# Patient Record
Sex: FEMALE | Race: WHITE
[De-identification: names, ages, dates, MRNs, and addresses within clinical notes are randomized per-mention and may not be internally consistent; named-entity substitution may affect disease eponyms.]

---
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
## 2018-10-28 ENCOUNTER — HOSPITAL ENCOUNTER (OUTPATIENT)
Dept: HOSPITAL 83 - LAB | Age: 18
Discharge: HOME | End: 2018-10-28
Attending: FAMILY MEDICINE
Payer: COMMERCIAL

## 2018-10-28 DIAGNOSIS — E78.00: Primary | ICD-10-CM

## 2018-10-28 DIAGNOSIS — E74.00: ICD-10-CM

## 2018-10-28 DIAGNOSIS — R53.83: ICD-10-CM

## 2018-10-28 DIAGNOSIS — F41.1: ICD-10-CM

## 2018-10-28 DIAGNOSIS — E55.9: ICD-10-CM

## 2018-10-28 LAB
ALBUMIN SERPL-MCNC: 3.7 GM/DL (ref 3.1–4.5)
ALP SERPL-CCNC: 89 U/L (ref 45–117)
ALT SERPL W P-5'-P-CCNC: 24 U/L (ref 12–78)
AST SERPL-CCNC: 14 IU/L (ref 3–35)
BUN SERPL-MCNC: 18 MG/DL (ref 7–24)
CHLORIDE SERPL-SCNC: 107 MMOL/L (ref 98–107)
CHOLEST SERPL-MCNC: 133 MG/DL (ref ?–200)
CREAT SERPL-MCNC: 1.04 MG/DL (ref 0.55–1.02)
ERYTHROCYTE [DISTWIDTH] IN BLOOD BY AUTOMATED COUNT: 14.1 % (ref 0–14.5)
HCT VFR BLD AUTO: 46.4 % (ref 37–47)
HDLC SERPL-MCNC: 33 MG/DL (ref 40–60)
HGB BLD-MCNC: 14.3 G/DL (ref 12–16)
LDLC SERPL DIRECT ASSAY-MCNC: 70 MG/DL (ref 9–159)
MCH RBC QN AUTO: 26 PG (ref 27–31)
MCHC RBC AUTO-ENTMCNC: 30.8 G/DL (ref 33–37)
MCV RBC AUTO: 84.4 FL (ref 81–99)
NRBC BLD QL AUTO: 0 10*3/UL (ref 0–0)
PLATELET # BLD AUTO: 307 10*3/UL (ref 130–400)
PMV BLD AUTO: 10.2 FL (ref 9.6–12.3)
POTASSIUM SERPL-SCNC: 4.1 MMOL/L (ref 3.5–5.1)
PROT SERPL-MCNC: 7.6 GM/DL (ref 6.4–8.2)
RBC # BLD AUTO: 5.5 10*6/UL (ref 4.1–5.1)
SODIUM SERPL-SCNC: 139 MMOL/L (ref 136–145)
TRIGL SERPL-MCNC: 149 MG/DL (ref ?–150)
TSH SERPL DL<=0.005 MIU/L-ACNC: 2.52 UIU/ML (ref 0.36–4.75)
VLDLC SERPL CALC-MCNC: 30 MG/DL (ref 6–40)
WBC NRBC COR # BLD AUTO: 14.2 10*3/UL (ref 4.8–10.8)

## 2019-10-01 ENCOUNTER — HOSPITAL ENCOUNTER (OUTPATIENT)
Dept: HOSPITAL 83 - LAB | Age: 19
Discharge: HOME | End: 2019-10-01
Attending: FAMILY MEDICINE
Payer: COMMERCIAL

## 2019-10-01 DIAGNOSIS — E74.00: ICD-10-CM

## 2019-10-01 DIAGNOSIS — L70.0: Primary | ICD-10-CM

## 2019-10-01 DIAGNOSIS — F41.1: ICD-10-CM

## 2019-10-01 DIAGNOSIS — D72.829: ICD-10-CM

## 2019-10-01 LAB
ALBUMIN SERPL-MCNC: 4 GM/DL (ref 3.1–4.5)
ALP SERPL-CCNC: 80 U/L (ref 45–117)
ALT SERPL W P-5'-P-CCNC: 40 U/L (ref 12–78)
AST SERPL-CCNC: 22 IU/L (ref 3–35)
BUN SERPL-MCNC: 12 MG/DL (ref 7–24)
CHLORIDE SERPL-SCNC: 105 MMOL/L (ref 98–107)
CHOLEST SERPL-MCNC: 155 MG/DL (ref ?–200)
CREAT SERPL-MCNC: 0.94 MG/DL (ref 0.55–1.02)
ERYTHROCYTE [DISTWIDTH] IN BLOOD BY AUTOMATED COUNT: 14 % (ref 0–14.5)
HCT VFR BLD AUTO: 44.7 % (ref 37–47)
HDLC SERPL-MCNC: 39 MG/DL (ref 40–60)
HGB BLD-MCNC: 14.5 G/DL (ref 12–16)
LDLC SERPL DIRECT ASSAY-MCNC: 89 MG/DL (ref 9–159)
MCH RBC QN AUTO: 27.2 PG (ref 27–31)
MCHC RBC AUTO-ENTMCNC: 32.4 G/DL (ref 33–37)
MCV RBC AUTO: 83.7 FL (ref 81–99)
NRBC BLD QL AUTO: 0 10*3/UL (ref 0–0)
PLATELET # BLD AUTO: 289 10*3/UL (ref 130–400)
PMV BLD AUTO: 10.5 FL (ref 9.6–12.3)
POTASSIUM SERPL-SCNC: 3.9 MMOL/L (ref 3.5–5.1)
PROT SERPL-MCNC: 7.8 GM/DL (ref 6.4–8.2)
RBC # BLD AUTO: 5.34 10*6/UL (ref 4.1–5.1)
SODIUM SERPL-SCNC: 138 MMOL/L (ref 136–145)
TRIGL SERPL-MCNC: 137 MG/DL (ref ?–150)
VLDLC SERPL CALC-MCNC: 27 MG/DL (ref 6–40)
WBC NRBC COR # BLD AUTO: 13.9 10*3/UL (ref 4.8–10.8)

## 2021-02-04 ENCOUNTER — ROUTINE PRENATAL (OUTPATIENT)
Dept: OBGYN CLINIC | Age: 21
End: 2021-02-04
Payer: COMMERCIAL

## 2021-02-04 ENCOUNTER — ANCILLARY PROCEDURE (OUTPATIENT)
Dept: OBGYN CLINIC | Age: 21
End: 2021-02-04
Payer: COMMERCIAL

## 2021-02-04 VITALS
HEIGHT: 66 IN | DIASTOLIC BLOOD PRESSURE: 73 MMHG | HEART RATE: 84 BPM | BODY MASS INDEX: 31.34 KG/M2 | TEMPERATURE: 97.9 F | SYSTOLIC BLOOD PRESSURE: 117 MMHG | WEIGHT: 195 LBS

## 2021-02-04 DIAGNOSIS — Z34.90 PREGNANCY, UNSPECIFIED GESTATIONAL AGE: ICD-10-CM

## 2021-02-04 LAB
GLUCOSE URINE, POC: NEGATIVE
PROTEIN UA: ABNORMAL

## 2021-02-04 PROCEDURE — 76811 OB US DETAILED SNGL FETUS: CPT | Performed by: OBSTETRICS & GYNECOLOGY

## 2021-02-04 PROCEDURE — 81002 URINALYSIS NONAUTO W/O SCOPE: CPT | Performed by: OBSTETRICS & GYNECOLOGY

## 2021-02-04 PROCEDURE — G8484 FLU IMMUNIZE NO ADMIN: HCPCS | Performed by: OBSTETRICS & GYNECOLOGY

## 2021-02-04 PROCEDURE — 1036F TOBACCO NON-USER: CPT | Performed by: OBSTETRICS & GYNECOLOGY

## 2021-02-04 PROCEDURE — 99202 OFFICE O/P NEW SF 15 MIN: CPT | Performed by: OBSTETRICS & GYNECOLOGY

## 2021-02-04 PROCEDURE — G8427 DOCREV CUR MEDS BY ELIG CLIN: HCPCS | Performed by: OBSTETRICS & GYNECOLOGY

## 2021-02-04 PROCEDURE — 99203 OFFICE O/P NEW LOW 30 MIN: CPT | Performed by: OBSTETRICS & GYNECOLOGY

## 2021-02-04 PROCEDURE — G8419 CALC BMI OUT NRM PARAM NOF/U: HCPCS | Performed by: OBSTETRICS & GYNECOLOGY

## 2021-02-04 NOTE — PATIENT INSTRUCTIONS
Please arrive for your scheduled appointment at least 15 minutes early with your actual insurance card+ a photo ID. Also if you need any refills ordered or have questions, it may take up 48 hours to reply. Please allow ample time for your refills. Call me when you use last refill. Thank you for your cooperation. Any questions contact Danielelfego at [phone]. If you are experiencing an emergency and need immediate help, call 911 or go to go emergency room or labor and delivery. Call your primary obstetrician with bleeding, leaking of fluid, abdominal tenderness, headache, blurry vision, epigastric pain and increased urinary frequency. if you are sick, not feeling well or have an infectious process going on please reschedule your appointment by calling [phone]. Also if any family members are not feeling well, please do not bring them to your appointment. We appreciate your cooperation. We are doing this in order to protect our pregnant mothers+ their babies. Patient Education        Weeks 18 to 22 of Your Pregnancy: Care Instructions  Your Care Instructions     Your baby is continuing to develop quickly. At this stage, babies can now suck their thumbs,  firmly with their hands, and open and close their eyelids. Sometime between 18 and 22 weeks, you will start to feel your baby move. At first, these small fetal movements feel like fluttering or \"butterflies. \" Some women say that they feel like gas bubbles. As the baby grows, these movements will become stronger. You may also notice that your baby kicks and hiccups. During this time, you may find that your nausea and fatigue are gone. Overall, you may feel better and have more energy than you did in your first trimester. But you may also have new discomforts now, such as sleep problems or leg cramps. This care sheet can help you ease these discomforts. Follow-up care is a key part of your treatment and safety.  Be sure to make and go to all appointments, and call your doctor if you are having problems. It's also a good idea to know your test results and keep a list of the medicines you take. How can you care for yourself at home? Ease sleep problems  · Avoid caffeine in drinks or chocolate late in the day. · Get some exercise every day. · Take a warm shower or bath before bed. · Have a light snack or glass of milk at bedtime. · Do relaxation exercises in bed to calm your mind and body. · Support your legs and back with extra pillows. Try a pillow between your legs if you sleep on your side. · Do not use sleeping pills or alcohol. They could harm your baby. Ease leg cramps  · Do not massage your calf during the cramp. · Sit on a firm bed or chair. Straighten your leg, and bend your foot (flex your ankle) slowly upward, toward your knee. Bend your toes up and down. · Stand on a cool, flat surface. Stretch your toes upward, and take small steps walking on your heels. · Use a heating pad or hot water bottle to help with muscle ache. Prevent leg cramps  · Be sure to get enough calcium. If you are worried that you are not getting enough, talk to your doctor. · Exercise every day, and stretch your legs before bed. · Take a warm bath before bed, and try leg warmers at night. Where can you learn more? Go to https://EdCaliberlisbeteb.[employer]. org and sign in to your Minubo account. Enter O785 in the [facility] box to learn more about \"Weeks 18 to 22 of Your Pregnancy: Care Instructions. \"     If you do not have an account, please click on the \"Sign Up Now\" link. Current as of: February 11, 2020               Content Version: 12.6  © 2817-6978 [employer]. Care instructions adapted under license by [facility] ([facility]). If you have questions about a medical condition or this instruction, always ask your healthcare professional. [address] any warranty or liability for your use of this information.          Patient Education        Learning About When to Call Your Doctor During Pregnancy (After 20 Weeks)  Your Care Instructions  It's common to have concerns about what might be a problem during pregnancy. Although most pregnant women don't have any serious problems, it's important to know when to call your doctor if you have certain symptoms or signs of labor. These are general suggestions. Your doctor may give you some more information about when to call. When to call your doctor (after 20 weeks)  Call 911 anytime you think you may need emergency care. For example, call if:  · You have severe vaginal bleeding. · You have sudden, severe pain in your belly. · You passed out (lost consciousness). · You have a seizure. · You see or feel the umbilical cord. · You think you are about to deliver your baby and can't make it safely to the hospital.  Call your doctor now or seek immediate medical care if:  · You have vaginal bleeding. · You have belly pain. · You have a fever. · You have symptoms of preeclampsia, such as:  ? Sudden swelling of your face, hands, or feet. ? New vision problems (such as dimness, blurring, or seeing spots). ? A severe headache. · You have a sudden release of fluid from your vagina. (You think your water broke.)  · You think that you may be in labor. This means that you've had at least 6 contractions in an hour. · You notice that your baby has stopped moving or is moving much less than normal.  · You have symptoms of a urinary tract infection. These may include:  ? Pain or burning when you urinate. ? A frequent need to urinate without being able to pass much urine. ? Pain in the flank, which is just below the rib cage and above the waist on either side of the back. ? Blood in your urine. Watch closely for changes in your health, and be sure to contact your doctor if:  · You have vaginal discharge that smells bad. · You have skin changes, such as:  ? A rash. ? Itching.   ? Yellow color to your skin. · You have other concerns about your pregnancy. If you have labor signs at 37 weeks or more  If you have signs of labor at 37 weeks or more, your doctor may tell you to call when your labor becomes more active. Symptoms of active labor include:  · Contractions that are regular. · Contractions that are less than 5 minutes apart. · Contractions that are hard to talk through. Follow-up care is a key part of your treatment and safety. Be sure to make and go to all appointments, and call your doctor if you are having problems. It's also a good idea to know your test results and keep a list of the medicines you take. Where can you learn more? Go to https://Hello UniversepeMonthlys.[employer]. org and sign in to your Tap.Me account. Enter  in the [employer] box to learn more about \"Learning About When to Call Your Doctor During Pregnancy (After 20 Weeks). \"     If you do not have an account, please click on the \"Sign Up Now\" link. Current as of: February 11, 2020               Content Version: 12.6  © 6938-2284 [employer]. Care instructions adapted under license by [facility] ([facility]). If you have questions about a medical condition or this instruction, always ask your healthcare professional. [place] [address] any warranty or liability for your use of this information.

## 2021-02-20 NOTE — PROGRESS NOTES
MFM Office Visit 2/4/2021      [facility] FETAL MEDICINE  [address] [place] [address].  [address] [place] [address]. [id number]  Ph: [phone] Fax: [phone]  February 4 ,2021    RE: Maryjane Chapa  [birth year]  Dear Anila Rico   :       Thank you for sending  [facility]. [facility]    for consultation and ultrasound in our office on  2/4/2021    REASON FOR CONSULTATION:   ? Abn Screen result o28.9  ? Fetal Anatomy Survey z36  ? Obesity o99.21  ? Size>/<Dates  o26.84  ( Unsure LMP / Irregular periods )   ? Her chart was reviewed, her medical, surgical , and OBG history was examined along with any supporting documents available to me . ? 1/12/21- US - 17w1d ? EDC 6.21.21  ( I dont have access to these images/measurements)  ? Quad screen 1/18 - using 6.21.21 EDC . --> @ 18w   - Reported risk  trisomy 21- 1:254   - No data provided to lab re weight, smoking status, race, DM risk   ? Her recent clinical visits and notes were reviewed. ? Her recent laboratory and pathology  testing was reviewed    Review of Systems :    CONSTITUTIONAL: No fever, no chills , no undue aches, pain    HEENT: No headache, no visual changes, no sore throat . No loss of smell, taste   PULM: No dyspnea, no cough   CARDIO:  No chest pains, no palpitations   GI: No N/V, no D/C, no diarrhea, no abdominal pain     : No dysuria, no vaginal bleeding or fluid leaking or discharge    She reports good fetal movement   PHYSICAL EXAMINATION: VSS - Afebrile  BMI 31    General Appearance: Healthy looking, alert , no acute distress.  Eyes: No pallor, no icterus, no photophobia.  Back: No CVA tenderness.  Abdomen: Soft, non-tender.  Extremities: No pretibial pitting edema    An ultrasound evaluation was done today. Please refer to the enclosed copy of the ultrasound report for further detailed information. ULTRASOUND IMPRESSION:    ? US is not diagnostic for fetal aneuploidy and may not detect all structural defects even if multiple exams are performed. karyotype abnormalities. I advised her that screening testing does not rule out the possibility of a fetal karyotype abnormality.  The noninvasive  tests (NIPT) available  screen for a limited number of most frequent chromosome abnormalities. - The more comprehensive testing currently available uses cell free fetal DNA    I advised her that the test screens only for trisomy 21, 18 and 13 ( +/-  T-16,-22)  and sex chromosomes.  Some tests also estimate a risk for fetal microdeletions- DiGeorge,Prader Willi/Angelman, Sonjane Apgar, Jordyn-Giedon, Cri-du Chat, Smith-Hirschorn, 1p36 deletion.   - She understands that the NIPT screening testing does not replace diagnostic genetic testing.  - Amnio - I discussed with the patient the option of performing a diagnostic genetic amniocentesis. We discussed the risks of the procedure, which include, but are not limited to possibility of injury to the fetus and the risk of pregnancy loss, which occurs in approximately 1 in every 200 hundred procedures performed. The patient stated that she would not consider the option of pregnancy termination under any circumstances, and therefore did not want to take the procedural related risk of pregnancy loss. .She declines amniocentesis and decided not to have NIPT drawn today  RECOMMENDATIONS:  1. Mid Trimester  concerns and precautions reviewed with patient. Discussed fetal movements and the role of  testing to help optimize growth and development and help predict/ avoid stress. Discussed trouble signs to watch for. 2. The patient is to continue to follow with you in your office for ongoing obstetric care. 3. Followup visit in  4 weeks . 4. [facility] Appointment has been tentatively scheduled 3.18.21   5. Followup timing recommendations during current COVID surge may change depending on community status and relative benefit/ risk  6. COVID precautions reviewed- advised to check with hospital before arrival  7.  Further evaluation and management will be dependent on her clinical presentation and the results of her testing. Once again, thank you for allowing us to participate in the care of this patient and if we can be of any further assistance to you, please do not hesitate to contact us. Sincerely,      Debra Chand MD  I was present during her visit , supervised the ultrasound examination and provided the patient evaluation and management. The total time in minutes spent regarding the patient today, reviewing medical records, reviewing imaging studies, performing ultrasonic imaging, reviewing laboratory testing, and documenting information was 26 minutes, of which, 50% of the time was spent in patient education, counseling, and coordinating care with the patient, her provider, and/or her family. I answered all of her questions to her satisfaction.  I asked her to call if she had any additional questions prior to her next visit

## 2021-03-18 ENCOUNTER — ROUTINE PRENATAL (OUTPATIENT)
Dept: OBGYN CLINIC | Age: 21
End: 2021-03-18
Payer: COMMERCIAL

## 2021-03-18 ENCOUNTER — ANCILLARY PROCEDURE (OUTPATIENT)
Dept: OBGYN CLINIC | Age: 21
End: 2021-03-18
Payer: COMMERCIAL

## 2021-03-18 VITALS
HEIGHT: 66 IN | HEART RATE: 97 BPM | DIASTOLIC BLOOD PRESSURE: 67 MMHG | SYSTOLIC BLOOD PRESSURE: 109 MMHG | TEMPERATURE: 97.5 F | WEIGHT: 205.4 LBS | BODY MASS INDEX: 33.01 KG/M2

## 2021-03-18 DIAGNOSIS — Z3A.26 26 WEEKS GESTATION OF PREGNANCY: ICD-10-CM

## 2021-03-18 DIAGNOSIS — O28.0 ABNORMAL QUAD SCREEN: ICD-10-CM

## 2021-03-18 DIAGNOSIS — Z34.90 PREGNANCY, UNSPECIFIED GESTATIONAL AGE: Primary | ICD-10-CM

## 2021-03-18 LAB
GLUCOSE URINE, POC: NEGATIVE
PROTEIN UA: NEGATIVE

## 2021-03-18 PROCEDURE — G8427 DOCREV CUR MEDS BY ELIG CLIN: HCPCS | Performed by: OBSTETRICS & GYNECOLOGY

## 2021-03-18 PROCEDURE — 99213 OFFICE O/P EST LOW 20 MIN: CPT | Performed by: OBSTETRICS & GYNECOLOGY

## 2021-03-18 PROCEDURE — 81002 URINALYSIS NONAUTO W/O SCOPE: CPT | Performed by: OBSTETRICS & GYNECOLOGY

## 2021-03-18 PROCEDURE — G8419 CALC BMI OUT NRM PARAM NOF/U: HCPCS | Performed by: OBSTETRICS & GYNECOLOGY

## 2021-03-18 PROCEDURE — 1036F TOBACCO NON-USER: CPT | Performed by: OBSTETRICS & GYNECOLOGY

## 2021-03-18 PROCEDURE — 76816 OB US FOLLOW-UP PER FETUS: CPT | Performed by: OBSTETRICS & GYNECOLOGY

## 2021-03-18 PROCEDURE — 99212 OFFICE O/P EST SF 10 MIN: CPT | Performed by: OBSTETRICS & GYNECOLOGY

## 2021-03-18 PROCEDURE — G8484 FLU IMMUNIZE NO ADMIN: HCPCS | Performed by: OBSTETRICS & GYNECOLOGY

## 2021-03-18 NOTE — PATIENT INSTRUCTIONS
Call your primary obstetrician with bleeding, leaking of fluid, abdominal tenderness, headache, blurry vision, epigastric pain and increased urinary frequency. Any questions contact Arjun at [phone]. If you are experiencing an emergency and need immediate help, call 911 or go to go emergency room or labor and delivery. Please arrive for your scheduled appointment at least 15 minutes early with your actual insurance card+ a photo ID. Also if you need any refills ordered or have questions, it may take up 48 hours to reply. Please allow ample time for your refills. Call me when you use last refill. Thank you for your cooperation. You might be having an NST at your next appt. Please eat a large snack or breakfast before coming to office. Thank youDo kick counts after dinner. Call your primary obstetrician if less than 10 kicks in 2 hours after dinner. Call your primary obstetrician with bleeding, leaking of fluid, abdominal tenderness, headache, blurry vision, epigastric pain and increased urinary frequency. if you are sick, not feeling well or have an infectious process going on please reschedule your appointment by calling [phone]. Also if any family members are not feeling well, please do not bring them to your appointment. We appreciate your cooperation. We are doing this in order to protect our pregnant mothers+ their babies. Patient Education        Learning About When to Call Your Doctor During Pregnancy (After 20 Weeks)  Your Care Instructions  It's common to have concerns about what might be a problem during pregnancy. Although most pregnant women don't have any serious problems, it's important to know when to call your doctor if you have certain symptoms or signs of labor. These are general suggestions. Your doctor may give you some more information about when to call. When to call your doctor (after 20 weeks)  Call 911 anytime you think you may need emergency care.  For example, call if:  · You have severe vaginal bleeding. · You have sudden, severe pain in your belly. · You passed out (lost consciousness). · You have a seizure. · You see or feel the umbilical cord. · You think you are about to deliver your baby and can't make it safely to the hospital.  Call your doctor now or seek immediate medical care if:  · You have vaginal bleeding. · You have belly pain. · You have a fever. · You have symptoms of preeclampsia, such as:  ? Sudden swelling of your face, hands, or feet. ? New vision problems (such as dimness, blurring, or seeing spots). ? A severe headache. · You have a sudden release of fluid from your vagina. (You think your water broke.)  · You think that you may be in labor. This means that you've had at least 6 contractions in an hour. · You notice that your baby has stopped moving or is moving much less than normal.  · You have symptoms of a urinary tract infection. These may include:  ? Pain or burning when you urinate. ? A frequent need to urinate without being able to pass much urine. ? Pain in the flank, which is just below the rib cage and above the waist on either side of the back. ? Blood in your urine. Watch closely for changes in your health, and be sure to contact your doctor if:  · You have vaginal discharge that smells bad. · You have skin changes, such as:  ? A rash. ? Itching. ? Yellow color to your skin. · You have other concerns about your pregnancy. If you have labor signs at 37 weeks or more  If you have signs of labor at 37 weeks or more, your doctor may tell you to call when your labor becomes more active. Symptoms of active labor include:  · Contractions that are regular. · Contractions that are less than 5 minutes apart. · Contractions that are hard to talk through. Follow-up care is a key part of your treatment and safety. Be sure to make and go to all appointments, and call your doctor if you are having problems.  It's also a good idea to know your test results and keep a list of the medicines you take. Where can you learn more? Go to https://chpepiceweb.En[employer]a. org and sign in to your [employer] account. Enter  in the [facility] box to learn more about \"Learning About When to Call Your Doctor During Pregnancy (After 20 Weeks). \"     If you do not have an account, please click on the \"Sign Up Now\" link. Current as of: October 8, 2020               Content Version: 12.8  © 3221-0188 [employer]. Care instructions adapted under license by [facility] ([facility]). If you have questions about a medical condition or this instruction, always ask your healthcare professional. [place] [address] any warranty or liability for your use of this information. Patient Education        Counting Your Baby's Kicks: Care Instructions  Your Care Instructions     Counting your baby's kicks is one way your doctor can tell that your baby is healthy. Most women--especially in a first pregnancy--feel their baby move for the first time between 16 and 22 weeks. The movement may feel like flutters rather than kicks. Your baby may move more at certain times of the day. When you are active, you may notice less kicking than when you are resting. At your prenatal visits, your doctor will ask whether the baby is active. In your last trimester, your doctor may ask you to count the number of times you feel your baby move. Follow-up care is a key part of your treatment and safety. Be sure to make and go to all appointments, and call your doctor if you are having problems. It's also a good idea to know your test results and keep a list of the medicines you take. How do you count fetal kicks? · A common method of checking your baby's movement is to count the number of kicks or moves you feel in 1 hour.  Ten movements (such as kicks, flutters, or rolls) in 1 hour are normal. Some doctors suggest that you count in the morning until you get to 10 movements. Then you can quit for that day and start again the next day. · Pick your baby's most active time of day to count. This may be any time from morning to evening. · If you do not feel 10 movements in an hour, your baby may be sleeping. Wait for the next hour and count again. When should you call for help? Call your doctor now or seek immediate medical care if:    · You noticed that your baby has stopped moving or is moving much less than normal.   Watch closely for changes in your health, and be sure to contact your doctor if you have any problems. Where can you learn more? Go to https://Mattscloset.compeInfaCare Pharmaceuticaleb.Visitar. org and sign in to your TheTakes account. Enter O956 in the [employer] box to learn more about \"Counting Your Baby's Kicks: Care Instructions. \"     If you do not have an account, please click on the \"Sign Up Now\" link. Current as of: October 8, 2020               Content Version: 12.8  © 2006-2021 [employer], Incorporated. Care instructions adapted under license by [facility] ([facility]). If you have questions about a medical condition or this instruction, always ask your healthcare professional. [place] [address] any warranty or liability for your use of this information.

## 2021-03-24 NOTE — PROGRESS NOTES
[facility] Office Visit 4.52.63      [facility] 31.  [address] [facility], [place]. [address]  Ph: [phone] Fax: [phone]       RE: Genoveva Amato  89  Dear Juan Field   :       Thank you for sending  Ms. Fili Marx    for consultation and ultrasound in our office on  3/18/21      REASON FOR CONSULTATION: 18yo WF  @ 26w3d   · Abn Screen result o28.9  · Fetal Anatomy Survey z36  · Obesity o99.21  · Size>/<Dates  o26.84  ( Unsure LMP / Irregular periods )   · Her chart was reviewed, her medical, surgical , and OBG history was examined along with any supporting documents available to me . ? 21- US - 17w1d ? EDC 21  ( I dont have access to these images/measurements)  ? Quad screen  - using 21 EDC . --> @ 18w   § Reported risk  trisomy 24- 1:254   § No data provided to lab re weight, smoking status, race, DM risk   · Her recent clinical visits and notes were reviewed. · Her recent laboratory and pathology  testing was reviewed     Review of Systems :   · CONSTITUTIONAL: No fever, no chills , no undue aches, pain   · HEENT: No headache, no visual changes, no sore throat . No loss of smell, taste  · PULM: No dyspnea, no cough  · CARDIO:  No chest pains, no palpitations  · GI: No N/V, no D/C, no diarrhea, no abdominal pain   ·  : No dysuria, no vaginal bleeding or fluid leaking or discharge   · She reports good fetal movement   PHYSICAL EXAMINATION: VSS - Afebrile  BMI 31   · General Appearance: Healthy looking, alert , no acute distress. · Eyes: No pallor, no icterus, no photophobia. · Back: No CVA tenderness. · Abdomen: Soft, non-tender. · Extremities: No pretibial pitting edema     An ultrasound evaluation was done today. Please refer to the enclosed copy of the ultrasound report for further detailed information. ULTRASOUND IMPRESSION:    · Within developmental and technical limits of ultrasound assessment,   ?  Breech Female   fetus @  34w2d ? Active, responsive baby. The amniotic fluid volume appears normal.   ? The fetus is measuring appropriate for gestational age. ? 1030g (2:4)  62%ile  ? There has been good interval growth and development . ? Fetal anatomy was reviewed and appears normal.   § ~Many of the important findings cannot be fully assessed at this gestational age  [de-identified] heart appeared structurally intact, with an intact septum, valve and outflow tract configuration. §   Smaller clinically significant lesions, such as atrial septa defects adjacent to the foramen ovale, may not become detectable until baby arrives. ? Transabdominal views of cervix appears to be closed, long, without significant funneling upon Valsalva. ? Soft markers for aneuploidy are examined and found to be favorable. We discussed soft markers - and utility for assessing increased or lessened fetal genetic risks   Of note , her baby did NOT show any of the following soft markers- (reassuring)   · NO Pyelectasis   · NO Structural anomaly   · NO Cardiac defect  Or echogenic foci   · NO Increased Nuchal fold>6mm  · NO Echogenic bowel  · NO Sandal gap  · NO Choroid plexus cyst  · NO Absent umbilical artery  · NO Hypoplastic 5th digit  · NO Short or low set  ear  · NO Palmar crease  · NO Missing /Short Nasal Bone  · NO Aberrant Right subclavian Artery  ·  NO Iliac angle > 90     We discussed the concept that a fetus passing all these genetic ultrasound screen tests would lessen the risk estimate by  ~60%-  ( i.e. 1:254? 1:684)   With the important Caveat that 1/3 of all babies born with Down syndrome showed no birth defects or previous ultrasound markers . · She noted fetal movement, no cramping or contractions . Discussion:   §  We discussed the options for genetic screening for fetal karyotype abnormalities. I advised her that screening testing does not rule out the possibility of a fetal karyotype abnormality.     § The noninvasive  tests (NIPT) available  screen for a limited number of most frequent chromosome abnormalities. § The more comprehensive testing currently available uses cell free fetal DNA   § I advised her that the test screens only for trisomy 21, 18 and 13 ( +/-  T-16,-22)  and sex chromosomes. § Some tests also estimate a risk for fetal microdeletions- DiGeorge,Prader Willi/Angelman, Jigneshvin Landau, Jordyn-Giedon, Cri-du Chat, Smith-Hirschorn, 1p36 deletion. § She understands that the NIPT screening testing does not replace diagnostic genetic testing. § Amnio - I discussed with the patient the option of performing a diagnostic genetic amniocentesis. We discussed the risks of the procedure, which include, but are not limited to possibility of injury to the fetus and the risk of pregnancy loss, which occurs in approximately 1 in every 200 hundred procedures performed. The patient stated that she would not consider the option of pregnancy termination under any circumstances, and therefore did not want to take the procedural related risk of pregnancy loss. .She declines amniocentesis and decided not to have NIPT drawn again today    RECOMMENDATIONS:  1. Second Trimester  concerns and precautions reviewed with patient. Discussed fetal movements and the role of  testing to help optimize growth and development and help predict/ avoid stress. Discussed trouble signs to watch for. 2. The patient is to continue to follow with you in your office for ongoing obstetric care. 3. Followup visit in  4 weeks . 4. MFM Appointment has been tentatively scheduled 21   5. Followup timing recommendations during current COVID surge may change depending on community status and relative benefit/ risk  6. COVID precautions reviewed- advised to check with hospital before arrival  7. Further evaluation and management will be dependent on her clinical presentation and the results of her testing.     Once again, thank you for allowing us to

## 2021-05-06 ENCOUNTER — ANCILLARY PROCEDURE (OUTPATIENT)
Dept: OBGYN CLINIC | Age: 21
End: 2021-05-06
Payer: COMMERCIAL

## 2021-05-06 ENCOUNTER — ROUTINE PRENATAL (OUTPATIENT)
Dept: OBGYN CLINIC | Age: 21
End: 2021-05-06
Payer: COMMERCIAL

## 2021-05-06 VITALS
BODY MASS INDEX: 33.8 KG/M2 | WEIGHT: 210.3 LBS | DIASTOLIC BLOOD PRESSURE: 73 MMHG | HEART RATE: 89 BPM | SYSTOLIC BLOOD PRESSURE: 119 MMHG | HEIGHT: 66 IN

## 2021-05-06 DIAGNOSIS — O28.9 ABNORMAL FINDINGS ON ANTENATAL SCREENING OF MOTHER: ICD-10-CM

## 2021-05-06 DIAGNOSIS — Z3A.33 33 WEEKS GESTATION OF PREGNANCY: Primary | ICD-10-CM

## 2021-05-06 DIAGNOSIS — O26.843 UTERINE SIZE-DATE DISCREPANCY IN THIRD TRIMESTER: ICD-10-CM

## 2021-05-06 LAB
GLUCOSE URINE, POC: NEGATIVE
PROTEIN UA: NEGATIVE

## 2021-05-06 PROCEDURE — G8427 DOCREV CUR MEDS BY ELIG CLIN: HCPCS | Performed by: OBSTETRICS & GYNECOLOGY

## 2021-05-06 PROCEDURE — 76820 UMBILICAL ARTERY ECHO: CPT | Performed by: OBSTETRICS & GYNECOLOGY

## 2021-05-06 PROCEDURE — 81002 URINALYSIS NONAUTO W/O SCOPE: CPT | Performed by: OBSTETRICS & GYNECOLOGY

## 2021-05-06 PROCEDURE — 76819 FETAL BIOPHYS PROFIL W/O NST: CPT | Performed by: OBSTETRICS & GYNECOLOGY

## 2021-05-06 PROCEDURE — 99212 OFFICE O/P EST SF 10 MIN: CPT | Performed by: OBSTETRICS & GYNECOLOGY

## 2021-05-06 PROCEDURE — 76816 OB US FOLLOW-UP PER FETUS: CPT | Performed by: OBSTETRICS & GYNECOLOGY

## 2021-05-06 PROCEDURE — G8419 CALC BMI OUT NRM PARAM NOF/U: HCPCS | Performed by: OBSTETRICS & GYNECOLOGY

## 2021-05-06 PROCEDURE — 99213 OFFICE O/P EST LOW 20 MIN: CPT | Performed by: OBSTETRICS & GYNECOLOGY

## 2021-05-06 PROCEDURE — 1036F TOBACCO NON-USER: CPT | Performed by: OBSTETRICS & GYNECOLOGY

## 2021-05-06 NOTE — PATIENT INSTRUCTIONS
time. You can't decide at the last minute. If you haven't already had the Tdap shot during this pregnancy, talk to your doctor about getting it. It will help protect your  against pertussis infection. Follow-up care is a key part of your treatment and safety. Be sure to make and go to all appointments, and call your doctor if you are having problems. It's also a good idea to know your test results and keep a list of the medicines you take. How can you care for yourself at home? Ease hemorrhoids  · Get more liquids, fruits, vegetables, and fiber in your diet. This will help keep your stools soft. · Avoid sitting for too long. Lie on your left side several times a day. · Clean yourself with soft, moist toilet paper. Or you can use witch hazel pads or personal hygiene pads. · If you are uncomfortable, try ice packs. Or you can sit in a warm sitz bath. Do these for 20 minutes at a time, as needed. · Use hydrocortisone cream for pain and itching. Two examples are Anusol and Preparation H Hydrocortisone. · Ask your doctor about taking an over-the-counter stool softener. Consider breastfeeding  · Experts recommend that women breastfeed for 1 year or longer. · Breast milk may help protect your child from some health problems.  babies are less likely than formula-fed babies to:  ? Get ear infections, colds, diarrhea, and pneumonia. ? Be obese or get diabetes later in life. · Women who breastfeed have less bleeding after the birth. Their uteruses also shrink back faster. · Some women who breastfeed lose weight faster. Making milk burns calories. · Breastfeeding can lower your risk of breast cancer, ovarian cancer, and osteoporosis. Decide about circumcision for boys  · As you make this decision, it may help to think about your personal, Zoroastrianism, and family traditions. You get to decide if you will keep your son's penis natural or if he will be circumcised.   · If you decide that you would like to have your baby circumcised, talk with your doctor. You can share your concerns about pain. And you can discuss your preferences for anesthesia. Where can you learn more? Go to https://chpetony.healthCalsys. org and sign in to your [employer] account. Enter A587 in the [facility] box to learn more about \"Weeks 32 to 34 of Your Pregnancy: Care Instructions. \"     If you do not have an account, please click on the \"Sign Up Now\" link. Current as of: October 8, 2020               Content Version: 12.8  © 5849-8657 [employer]. Care instructions adapted under license by [facility] ([facility]). If you have questions about a medical condition or this instruction, always ask your healthcare professional. [place] [address] any warranty or liability for your use of this information. Patient Education        Learning About When to Call Your Doctor During Pregnancy (After 20 Weeks)  Your Care Instructions  It's common to have concerns about what might be a problem during pregnancy. Although most pregnant women don't have any serious problems, it's important to know when to call your doctor if you have certain symptoms or signs of labor. These are general suggestions. Your doctor may give you some more information about when to call. When to call your doctor (after 20 weeks)  Call 911 anytime you think you may need emergency care. For example, call if:  · You have severe vaginal bleeding. · You have sudden, severe pain in your belly. · You passed out (lost consciousness). · You have a seizure. · You see or feel the umbilical cord. · You think you are about to deliver your baby and can't make it safely to the hospital.  Call your doctor now or seek immediate medical care if:  · You have vaginal bleeding. · You have belly pain. · You have a fever. · You have symptoms of preeclampsia, such as:  ? Sudden swelling of your face, hands, or feet.   ? New vision problems (such as dimness, blurring, or seeing spots). ? A severe headache. · You have a sudden release of fluid from your vagina. (You think your water broke.)  · You think that you may be in labor. This means that you've had at least 6 contractions in an hour. · You notice that your baby has stopped moving or is moving much less than normal.  · You have symptoms of a urinary tract infection. These may include:  ? Pain or burning when you urinate. ? A frequent need to urinate without being able to pass much urine. ? Pain in the flank, which is just below the rib cage and above the waist on either side of the back. ? Blood in your urine. Watch closely for changes in your health, and be sure to contact your doctor if:  · You have vaginal discharge that smells bad. · You have skin changes, such as:  ? A rash. ? Itching. ? Yellow color to your skin. · You have other concerns about your pregnancy. If you have labor signs at 37 weeks or more  If you have signs of labor at 37 weeks or more, your doctor may tell you to call when your labor becomes more active. Symptoms of active labor include:  · Contractions that are regular. · Contractions that are less than 5 minutes apart. · Contractions that are hard to talk through. Follow-up care is a key part of your treatment and safety. Be sure to make and go to all appointments, and call your doctor if you are having problems. It's also a good idea to know your test results and keep a list of the medicines you take. Where can you learn more? Go to https://Telecoast Communicationsradha.Absio. org and sign in to your [employer] account. Enter  in the [facility] box to learn more about \"Learning About When to Call Your Doctor During Pregnancy (After 20 Weeks). \"     If you do not have an account, please click on the \"Sign Up Now\" link. Current as of: October 8, 2020               Content Version: 12.8  © 3419-8190 [employer].    Care instructions adapted under license by [facility] ([facility]). If you have questions about a medical condition or this instruction, always ask your healthcare professional. [address] any warranty or liability for your use of this information. Patient Education        Counting Your Baby's Kicks: Care Instructions  Your Care Instructions     Counting your baby's kicks is one way your doctor can tell that your baby is healthy. Most women--especially in a first pregnancy--feel their baby move for the first time between 16 and 22 weeks. The movement may feel like flutters rather than kicks. Your baby may move more at certain times of the day. When you are active, you may notice less kicking than when you are resting. At your prenatal visits, your doctor will ask whether the baby is active. In your last trimester, your doctor may ask you to count the number of times you feel your baby move. Follow-up care is a key part of your treatment and safety. Be sure to make and go to all appointments, and call your doctor if you are having problems. It's also a good idea to know your test results and keep a list of the medicines you take. How do you count fetal kicks? · A common method of checking your baby's movement is to count the number of kicks or moves you feel in 1 hour. Ten movements (such as kicks, flutters, or rolls) in 1 hour are normal. Some doctors suggest that you count in the morning until you get to 10 movements. Then you can quit for that day and start again the next day. · Pick your baby's most active time of day to count. This may be any time from morning to evening. · If you do not feel 10 movements in an hour, your baby may be sleeping. Wait for the next hour and count again. When should you call for help?    Call your doctor now or seek immediate medical care if:    · You noticed that your baby has stopped moving or is moving much less than normal.   Watch closely for changes in your health, and be sure to

## 2021-05-24 NOTE — PROGRESS NOTES
[facility] Office Visit 21        [facility] 31.  [address] [place], [place]. [address]  Ph: [phone] Fax: [phone]  May 6,  2021     RE: Sathya Mistry  89  Dear Veena Power   :       Thank you for sending  Ms. Alber Huddleston    for consultation and ultrasound in our office on  21      REASON FOR CONSULTATION: 16yo WF  @ 33w3d   · Abn Screen result o28.9  · Fetal Anatomy Survey z36  · Obesity o99.21  · Size>/<Dates  o26.84  ( Unsure LMP / Irregular periods )   · Her chart was reviewed, her medical, surgical , and OBG history was examined along with any supporting documents available to me . ? 21- US - 17w1d ? EDC 21  ( I dont have access to these images/measurements)  ? Quad screen  - using 21 EDC . --> @ 18w   § Reported risk  trisomy 24- 1:254   § No data provided to lab re weight, smoking status, race, DM risk   · Her recent clinical visits and notes were reviewed. · Her recent laboratory and pathology  testing was reviewed  · 3/29/21  Ihr GS WNL, reassuring CBC      Review of Systems :   · CONSTITUTIONAL: No fever, no chills , no undue aches, pain   · HEENT: No headache, no visual changes, no sore throat . No loss of smell, taste  · PULM: No dyspnea, no cough  · CARDIO:  No chest pains, no palpitations  · GI: No N/V, no D/C, no diarrhea, no abdominal pain   ·  : No dysuria, no vaginal bleeding or fluid leaking or discharge   · She reports good fetal movement   PHYSICAL EXAMINATION: VSS - Afebrile  BMI 34  · General Appearance: Healthy looking, alert , no acute distress. · Eyes: No pallor, no icterus, no photophobia. · Back: No CVA tenderness. · Abdomen: Soft, non-tender. · Extremities: No pretibial pitting edema     An ultrasound evaluation was done today. Please refer to the enclosed copy of the ultrasound report for further detailed information.   ULTRASOUND IMPRESSION:    · Within developmental and technical limits of ultrasound assessment,   ? [place] Female   fetus @  33w3d   ? Active, responsive baby. The amniotic fluid volume appears normal.   ? The fetus is measuring appropriate for gestational age. ? 2327g (5:2)  67%ile  ? There has been very good interval growth and development . ? Fetal anatomy was reviewed and appears normal.   §   Babys heart appeared structurally intact, with an intact septum, valve and outflow tract configuration. §   Smaller clinically significant lesions, such as atrial septa defects adjacent to the foramen ovale, may not become detectable until baby arrives. ? Transabdominal views of cervix appears to be closed, long, without significant funneling upon Valsalva. ? Soft markers for aneuploidy are examined and found to be favorable. · She noted fetal movement, no cramping or contractions . Discussion:   §  We discussed the options for genetic screening for fetal karyotype abnormalities. § She declines amniocentesis and decided not to have NIPT drawn again today     RECOMMENDATIONS:  1. Third Trimester  concerns and precautions reviewed with patient. Discussed fetal movements and the role of  testing to help optimize growth and development and help predict/ avoid stress. Discussed trouble signs to watch for. 2. The patient is to continue to follow with you in your office for ongoing obstetric care. 3. Followup visit in  4 weeks . 4. MFM Appointment has been tentatively scheduled 6.3.21   5. Followup timing recommendations during current COVID surge may change depending on community status and relative benefit/ risk  6. Recent 2021  COVID vaccination /ACOG/SMFM/CDC recommendations reviewed- family will consider this. 7. Further evaluation and management will be dependent on her clinical presentation and the results of her testing.     Once again, thank you for allowing us to participate in the care of this patient and if we can be of any further assistance to you, please do not hesitate to contact us. Sincerely,      Gloria Camarena MD  I was present during her visit , supervised the ultrasound examination and provided the patient evaluation and management. The total time in minutes spent regarding the patient today, reviewing medical records, reviewing imaging studies, performing ultrasonic imaging, reviewing laboratory testing, and documenting information was 16 minutes, of which, 50% of the time was spent in patient education, counseling, and coordinating care with the patient, her provider, and/or her family. I answered all of her questions to her satisfaction. I asked her to call if she had any additional questions prior to her next visit        ACOG  COVID 19/ Pregnancy Recommendation April 2021  1. COVID-19 vaccines should be available and administered to pregnant individuals who choose to be vaccinated. 2. ACOG recommends COVID-19 vaccines be offered to lactating individuals. 3. There is currently no preference for the use of one COVID-19 vaccine over another except for 1217 year olds who are only eligible for the Pfizer-BioNtech vaccine  4. However, pregnant, lactating, and post-partum people aged <50 years should be aware of the rare risk of TTS after receipt of the Cindi Fess COVID-19 vaccine and that other FDA-authorized COVID-19 vaccines (i.e., mRNA vaccines) are available. 5. Vaccination can be received in any clinical setting and nonclinical community-based vaccination sites such as schools, community centers, and other mass vaccination locations. 6. Pregnant individuals who experience fever following vaccination should be counseled to take acetaminophen. Acetaminophen has been proven to be safe for use in pregnancy and does not appear to impact antibody response to COVID-19 vaccines. 7. COVID-19 vaccines should not be administered within 14 days of receipt of another vaccine.  For pregnant individuals, vaccines including Tdap and influenza should be deferred for 14 days after the administration of COVID-19 vaccines. 8. Anti-D immunoglobulin (i.e. Rhogam) should not be withheld from an individual who is planning or has recently received a COVID-19 vaccine as it will not interfere with the immune response to the vaccine. 9. In the interest of patient autonomy, ACOG recommends that pregnant individuals be free to make their own decision regarding COVID-19 vaccination. 10. Pregnant individuals who receive a COVID-19 vaccine should be educated about and encouraged to participate in CDCs V-SAFE program (see below for more information on CDCs V-SAFE program).

## 2021-06-03 ENCOUNTER — ROUTINE PRENATAL (OUTPATIENT)
Dept: OBGYN CLINIC | Age: 21
End: 2021-06-03
Payer: COMMERCIAL

## 2021-06-03 ENCOUNTER — ANCILLARY PROCEDURE (OUTPATIENT)
Dept: OBGYN CLINIC | Age: 21
End: 2021-06-03
Payer: COMMERCIAL

## 2021-06-03 VITALS
HEART RATE: 103 BPM | WEIGHT: 212 LBS | BODY MASS INDEX: 34.22 KG/M2 | DIASTOLIC BLOOD PRESSURE: 72 MMHG | SYSTOLIC BLOOD PRESSURE: 106 MMHG

## 2021-06-03 DIAGNOSIS — Z3A.37 37 WEEKS GESTATION OF PREGNANCY: Primary | ICD-10-CM

## 2021-06-03 LAB
GLUCOSE URINE, POC: NORMAL
PROTEIN UA: POSITIVE

## 2021-06-03 PROCEDURE — 81002 URINALYSIS NONAUTO W/O SCOPE: CPT | Performed by: OBSTETRICS & GYNECOLOGY

## 2021-06-03 PROCEDURE — G8427 DOCREV CUR MEDS BY ELIG CLIN: HCPCS | Performed by: OBSTETRICS & GYNECOLOGY

## 2021-06-03 PROCEDURE — 76816 OB US FOLLOW-UP PER FETUS: CPT | Performed by: OBSTETRICS & GYNECOLOGY

## 2021-06-03 PROCEDURE — 76820 UMBILICAL ARTERY ECHO: CPT | Performed by: OBSTETRICS & GYNECOLOGY

## 2021-06-03 PROCEDURE — 99212 OFFICE O/P EST SF 10 MIN: CPT | Performed by: OBSTETRICS & GYNECOLOGY

## 2021-06-03 PROCEDURE — 76819 FETAL BIOPHYS PROFIL W/O NST: CPT | Performed by: OBSTETRICS & GYNECOLOGY

## 2021-06-03 PROCEDURE — 99213 OFFICE O/P EST LOW 20 MIN: CPT | Performed by: OBSTETRICS & GYNECOLOGY

## 2021-06-03 PROCEDURE — G8419 CALC BMI OUT NRM PARAM NOF/U: HCPCS | Performed by: OBSTETRICS & GYNECOLOGY

## 2021-06-03 PROCEDURE — 1036F TOBACCO NON-USER: CPT | Performed by: OBSTETRICS & GYNECOLOGY

## 2021-06-03 NOTE — LETTER
[facility] CARE AT [facility] Maternal Fetal [facility]  [address] [place] [address], [address] [place] [address]  [address] [address]  Phone: [phone]  Fax: [phone]           Holli Gill MD      Yasmin 3, 2021     Patient: Jose Reis   MR Number: [id number]   YOB: 2000   Date of Visit: 6/3/2021       Dear Dr. Paty Mauro:    Thank you for referring Jose Reis to me for evaluation/treatment. Below are the relevant portions of my assessment and plan of care. If you have questions, please do not hesitate to call me. I look forward to following Erik Delvalle along with you.     Sincerely,    MD Holli Thomas MD    CC providers:  Keith Iraheta MD  [address] [place] [address]  [address] [place]

## 2021-06-03 NOTE — PATIENT INSTRUCTIONS
to all appointments, and call your doctor if you are having problems. It's also a good idea to know your test results and keep a list of the medicines you take. How can you care for yourself at home? Learn about breastfeeding  · Breastfeeding is best for your baby and good for you. · Breast milk has antibodies to help your baby fight infections. · Mothers who breastfeed often lose weight faster, because making milk burns calories. · Learning the best ways to hold your baby will make breastfeeding easier. · Let your partner bathe and diaper the baby to keep your partner from feeling left out. Snuggle together when you breastfeed. · You may want to learn how to use a breast pump and store your milk. · If you choose to bottle feed, make the feeding feel like breastfeeding so you can bond with your baby. Always hold your baby and the bottle. Do not prop bottles or let your baby fall asleep with a bottle. Learn about crying  · It is common for babies to cry for 1 to 3 hours a day. Some cry more, some cry less. · Babies don't cry to make you upset or because you are a bad parent. · Crying is how your baby communicates. Your baby may be hungry; have gas; need a diaper change; or feel cold, warm, tired, lonely, or tense. Sometimes babies cry for unknown reasons. · If you respond to your baby's needs, he or she will learn to trust you. · Try to stay calm when your baby cries. Your baby may get more upset if he or she senses that you are upset. Know how to care for your   · Your baby's umbilical cord stump will drop off on its own, usually between 1 and 2 weeks. To care for your baby's umbilical cord area:  ? Clean the area at the bottom of the cord 2 or 3 times a day. ? Pay special attention to the area where the cord attaches to the skin. ? Keep the diaper folded below the cord. ? Use a damp washcloth or cotton ball to sponge bathe your baby until the stump has come off.   · Your baby's first dark stool is called meconium. After the meconium is passed, your baby will develop his or her own bowel pattern. ? Some babies, especially  babies, have several bowel movements a day. Others have one or two a day, or one every 2 to 3 days. ?  babies often have loose, yellow stools. Formula-fed babies have more formed stools. ? If your baby's stools look like little pellets, he or she is constipated. After 2 days of constipation, call your baby's doctor. · If your baby will be circumcised, you can care for him at home. ? Gently rinse his penis with warm water after every diaper change. Do not try to remove the film that forms on the penis. This film will go away on its own. Pat dry. ? Put petroleum ointment, such as Vaseline, on the area of the diaper that will touch your baby's penis. This will keep the diaper from sticking to your baby. ? Ask the doctor about giving your baby acetaminophen (Tylenol) for pain. Where can you learn more? Go to https://Smartling.SitScape. org and sign in to your "[employer] account. Enter 68 21 97 in the [employer] box to learn more about \"Week 37 of Your Pregnancy: Care Instructions. \"     If you do not have an account, please click on the \"Sign Up Now\" link. Current as of: October 8, 2020               Content Version: 12.8  © 2006-2021 [employer]. Care instructions adapted under license by [facility] ([facility]). If you have questions about a medical condition or this instruction, always ask your healthcare professional. [place] [address] any warranty or liability for your use of this information. Patient Education        Learning About When to Call Your Doctor During Pregnancy (After 20 Weeks)  Your Care Instructions  It's common to have concerns about what might be a problem during pregnancy.  Although most pregnant women don't have any serious problems, it's important to know when to call your doctor if you have certain symptoms or signs of labor. These are general suggestions. Your doctor may give you some more information about when to call. When to call your doctor (after 20 weeks)  Call 911 anytime you think you may need emergency care. For example, call if:  · You have severe vaginal bleeding. · You have sudden, severe pain in your belly. · You passed out (lost consciousness). · You have a seizure. · You see or feel the umbilical cord. · You think you are about to deliver your baby and can't make it safely to the hospital.  Call your doctor now or seek immediate medical care if:  · You have vaginal bleeding. · You have belly pain. · You have a fever. · You have symptoms of preeclampsia, such as:  ? Sudden swelling of your face, hands, or feet. ? New vision problems (such as dimness, blurring, or seeing spots). ? A severe headache. · You have a sudden release of fluid from your vagina. (You think your water broke.)  · You think that you may be in labor. This means that you've had at least 6 contractions in an hour. · You notice that your baby has stopped moving or is moving much less than normal.  · You have symptoms of a urinary tract infection. These may include:  ? Pain or burning when you urinate. ? A frequent need to urinate without being able to pass much urine. ? Pain in the flank, which is just below the rib cage and above the waist on either side of the back. ? Blood in your urine. Watch closely for changes in your health, and be sure to contact your doctor if:  · You have vaginal discharge that smells bad. · You have skin changes, such as:  ? A rash. ? Itching. ? Yellow color to your skin. · You have other concerns about your pregnancy. If you have labor signs at 37 weeks or more  If you have signs of labor at 37 weeks or more, your doctor may tell you to call when your labor becomes more active. Symptoms of active labor include:  · Contractions that are regular.   · Contractions that are fetal kicks? · A common method of checking your baby's movement is to count the number of kicks or moves you feel in 1 hour. Ten movements (such as kicks, flutters, or rolls) in 1 hour are normal. Some doctors suggest that you count in the morning until you get to 10 movements. Then you can quit for that day and start again the next day. · Pick your baby's most active time of day to count. This may be any time from morning to evening. · If you do not feel 10 movements in an hour, your baby may be sleeping. Wait for the next hour and count again. When should you call for help? Call your doctor now or seek immediate medical care if:    · You noticed that your baby has stopped moving or is moving much less than normal.   Watch closely for changes in your health, and be sure to contact your doctor if you have any problems. Where can you learn more? Go to https://eASIC.BetaStudios. org and sign in to your [employer] account. Enter Z290 in the Exacter box to learn more about \"Counting Your Baby's Kicks: Care Instructions. \"     If you do not have an account, please click on the \"Sign Up Now\" link. Current as of: October 8, 2020               Content Version: 12.8  © 2006-2021 [employer]. Care instructions adapted under license by [facility]. If you have questions about a medical condition or this instruction, always ask your healthcare professional. [place] [address] any warranty or liability for your use of this information.

## 2021-06-03 NOTE — PROGRESS NOTES
developmental and technical limits of ultrasound assessment,   ? Verttex Female   fetus @  37w3d   ? Active, responsive baby. The amniotic fluid volume appears normal.   ? The fetus is measuring appropriate for gestational age. ? 3547g (7:13)  91%ile  ? There has been very, very  good interval growth and development . ? Fetal anatomy was reviewed and appears normal.   §   Babys heart appeared structurally intact, with an intact septum, valve and outflow tract configuration. ? The biophysical profile is reassuring with a score of 8/8.   ? Umbilical artery Doppler studies are reassuring with good end-diastolic flow. · She noted fetal movement,  occasional mild cramping or contractions . Discussion:   - Meeting with [facility] in [facility]:  1. Third Trimester  concerns and precautions reviewed with patient. Discussed fetal movements and the role of  testing to help optimize growth and development and help predict/ avoid stress. Discussed trouble signs to watch for. 2. The patient is to continue to follow with you in your office for ongoing obstetric care. 3. MFM Appointment has NOT  been   scheduled     4. Followup timing recommendations during current COVID surge may change depending on community status and relative benefit/ risk  5. Recent 2021  COVID vaccination /ACOG/SMFM/CDC recommendations reviewed- family will consider this. 6. Further evaluation and management will be dependent on her clinical presentation and the results of her testing. Once again, thank you for allowing us to participate in the care of this patient and if we can be of any further assistance to you, please do not hesitate to contact us. Sincerely,        MD MARISOL Cantu was present during her visit , supervised the ultrasound examination and provided the patient evaluation and management.  The total time in minutes spent regarding the patient today, reviewing medical records, reviewing imaging studies, performing ultrasonic imaging, reviewing laboratory testing, and documenting information was 16 minutes, of which, 50% of the time was spent in patient education, counseling, and coordinating care with the patient, her provider, and/or her family. I answered all of her questions to her satisfaction. I asked her to call if she had any additional questions prior to her next visit           ACOG  COVID 19/ Pregnancy Recommendation April 2021  1. COVID-19 vaccines should be available and administered to pregnant individuals who choose to be vaccinated. 2. ACOG recommends COVID-19 vaccines be offered to lactating individuals. 3. There is currently no preference for the use of one COVID-19 vaccine over another except for 1217 year olds who are only eligible for the Pfizer-BioNtech vaccine  4. However, pregnant, lactating, and post-partum people aged <50 years should be aware of the rare risk of TTS after receipt of the Tennille Bong COVID-19 vaccine and that other FDA-authorized COVID-19 vaccines (i.e., mRNA vaccines) are available. 5. Vaccination can be received in any clinical setting and nonclinical community-based vaccination sites such as schools, community centers, and other mass vaccination locations. 6. Pregnant individuals who experience fever following vaccination should be counseled to take acetaminophen. Acetaminophen has been proven to be safe for use in pregnancy and does not appear to impact antibody response to COVID-19 vaccines. 7. COVID-19 vaccines should not be administered within 14 days of receipt of another vaccine. For pregnant individuals, vaccines including Tdap and influenza should be deferred for 14 days after the administration of COVID-19 vaccines.   8. Anti-D immunoglobulin (i.e. Rhogam) should not be withheld from an individual who is planning or has recently received a COVID-19 vaccine as it will not interfere with

## 2021-06-03 NOTE — PROGRESS NOTES
Patient here for prenatal ultrasound  Denies any cramping or LOF but states did have some spotting on Tuesday x1. Did have sex the day before.   +FM noted

## 2021-06-04 DIAGNOSIS — Z3A.37 37 WEEKS GESTATION OF PREGNANCY: ICD-10-CM

## 2021-06-07 LAB — GROUP B STREP CULTURE: NORMAL

## 2021-06-23 ENCOUNTER — ANESTHESIA EVENT (OUTPATIENT)
Dept: LABOR AND DELIVERY | Age: 21
DRG: 560 | End: 2021-06-23
Payer: COMMERCIAL

## 2021-06-23 ENCOUNTER — ANESTHESIA (OUTPATIENT)
Dept: LABOR AND DELIVERY | Age: 21
DRG: 560 | End: 2021-06-23
Payer: COMMERCIAL

## 2021-06-23 ENCOUNTER — HOSPITAL ENCOUNTER (INPATIENT)
Age: 21
LOS: 2 days | Discharge: HOME | DRG: 560 | End: 2021-06-25
Attending: OBSTETRICS & GYNECOLOGY | Admitting: OBSTETRICS & GYNECOLOGY
Payer: COMMERCIAL

## 2021-06-23 PROBLEM — R10.9 ABDOMINAL CRAMPING AFFECTING PREGNANCY, ANTEPARTUM: Status: ACTIVE | Noted: 2021-06-23

## 2021-06-23 PROBLEM — O26.899 ABDOMINAL CRAMPING AFFECTING PREGNANCY, ANTEPARTUM: Status: ACTIVE | Noted: 2021-06-23

## 2021-06-23 LAB
ABO/RH: NORMAL
AMPHETAMINE SCREEN, URINE: NOT DETECTED
ANTIBODY SCREEN: NORMAL
BARBITURATE SCREEN URINE: NOT DETECTED
BENZODIAZEPINE SCREEN, URINE: NOT DETECTED
CANNABINOID SCREEN URINE: POSITIVE
COCAINE METABOLITE SCREEN URINE: NOT DETECTED
FENTANYL SCREEN, URINE: NOT DETECTED
HCT VFR BLD CALC: 37.9 % (ref 34–48)
HEMOGLOBIN: 12.3 G/DL (ref 11.5–15.5)
Lab: ABNORMAL
MCH RBC QN AUTO: 26.2 PG (ref 26–35)
MCHC RBC AUTO-ENTMCNC: 32.5 % (ref 32–34.5)
MCV RBC AUTO: 80.6 FL (ref 80–99.9)
METHADONE SCREEN, URINE: NOT DETECTED
OPIATE SCREEN URINE: NOT DETECTED
OXYCODONE URINE: NOT DETECTED
PDW BLD-RTO: 14.6 FL (ref 11.5–15)
PHENCYCLIDINE SCREEN URINE: NOT DETECTED
PLATELET # BLD: 268 E9/L (ref 130–450)
PMV BLD AUTO: 10.7 FL (ref 7–12)
RBC # BLD: 4.7 E12/L (ref 3.5–5.5)
WBC # BLD: 19.6 E9/L (ref 4.5–11.5)

## 2021-06-23 PROCEDURE — 86850 RBC ANTIBODY SCREEN: CPT

## 2021-06-23 PROCEDURE — 2500000003 HC RX 250 WO HCPCS

## 2021-06-23 PROCEDURE — 2580000003 HC RX 258: Performed by: NURSE PRACTITIONER

## 2021-06-23 PROCEDURE — 86900 BLOOD TYPING SEROLOGIC ABO: CPT

## 2021-06-23 PROCEDURE — 6360000002 HC RX W HCPCS: Performed by: ANESTHESIOLOGY

## 2021-06-23 PROCEDURE — 51701 INSERT BLADDER CATHETER: CPT

## 2021-06-23 PROCEDURE — 3700000025 EPIDURAL BLOCK: Performed by: ANESTHESIOLOGY

## 2021-06-23 PROCEDURE — 6360000002 HC RX W HCPCS

## 2021-06-23 PROCEDURE — 80307 DRUG TEST PRSMV CHEM ANLYZR: CPT

## 2021-06-23 PROCEDURE — 86901 BLOOD TYPING SEROLOGIC RH(D): CPT

## 2021-06-23 PROCEDURE — 2500000003 HC RX 250 WO HCPCS: Performed by: ANESTHESIOLOGY

## 2021-06-23 PROCEDURE — 99231 SBSQ HOSP IP/OBS SF/LOW 25: CPT | Performed by: NURSE PRACTITIONER

## 2021-06-23 PROCEDURE — 85027 COMPLETE CBC AUTOMATED: CPT

## 2021-06-23 PROCEDURE — 1220000001 HC SEMI PRIVATE L&D R&B

## 2021-06-23 PROCEDURE — 36415 COLL VENOUS BLD VENIPUNCTURE: CPT

## 2021-06-23 PROCEDURE — G0480 DRUG TEST DEF 1-7 CLASSES: HCPCS

## 2021-06-23 RX ORDER — ONDANSETRON 2 MG/ML
4 INJECTION INTRAMUSCULAR; INTRAVENOUS EVERY 6 HOURS PRN
Status: DISCONTINUED | OUTPATIENT
Start: 2021-06-23 | End: 2021-06-24

## 2021-06-23 RX ORDER — LIDOCAINE HYDROCHLORIDE 10 MG/ML
INJECTION, SOLUTION INFILTRATION; PERINEURAL
Status: DISCONTINUED
Start: 2021-06-23 | End: 2021-06-24

## 2021-06-23 RX ORDER — SODIUM CHLORIDE, SODIUM LACTATE, POTASSIUM CHLORIDE, AND CALCIUM CHLORIDE .6; .31; .03; .02 G/100ML; G/100ML; G/100ML; G/100ML
500 INJECTION, SOLUTION INTRAVENOUS PRN
Status: DISCONTINUED | OUTPATIENT
Start: 2021-06-23 | End: 2021-06-24

## 2021-06-23 RX ORDER — NALOXONE HYDROCHLORIDE 0.4 MG/ML
0.4 INJECTION, SOLUTION INTRAMUSCULAR; INTRAVENOUS; SUBCUTANEOUS PRN
Status: DISCONTINUED | OUTPATIENT
Start: 2021-06-23 | End: 2021-06-24

## 2021-06-23 RX ORDER — BUPIVACAINE HYDROCHLORIDE 2.5 MG/ML
INJECTION, SOLUTION EPIDURAL; INFILTRATION; INTRACAUDAL PRN
Status: DISCONTINUED | OUTPATIENT
Start: 2021-06-23 | End: 2021-06-24 | Stop reason: SDUPTHER

## 2021-06-23 RX ORDER — PHYTONADIONE 1 MG/.5ML
INJECTION, EMULSION INTRAMUSCULAR; INTRAVENOUS; SUBCUTANEOUS
Status: DISCONTINUED
Start: 2021-06-23 | End: 2021-06-24

## 2021-06-23 RX ORDER — SODIUM CHLORIDE, SODIUM LACTATE, POTASSIUM CHLORIDE, CALCIUM CHLORIDE 600; 310; 30; 20 MG/100ML; MG/100ML; MG/100ML; MG/100ML
INJECTION, SOLUTION INTRAVENOUS CONTINUOUS
Status: DISCONTINUED | OUTPATIENT
Start: 2021-06-23 | End: 2021-06-24

## 2021-06-23 RX ORDER — SODIUM CHLORIDE, SODIUM LACTATE, POTASSIUM CHLORIDE, AND CALCIUM CHLORIDE .6; .31; .03; .02 G/100ML; G/100ML; G/100ML; G/100ML
1000 INJECTION, SOLUTION INTRAVENOUS PRN
Status: DISCONTINUED | OUTPATIENT
Start: 2021-06-23 | End: 2021-06-24

## 2021-06-23 RX ORDER — EPHEDRINE SULFATE/0.9% NACL/PF 50 MG/5 ML
10 SYRINGE (ML) INTRAVENOUS EVERY 10 MIN PRN
Status: DISCONTINUED | OUTPATIENT
Start: 2021-06-23 | End: 2021-06-24

## 2021-06-23 RX ORDER — ERYTHROMYCIN 5 MG/G
OINTMENT OPHTHALMIC
Status: DISCONTINUED
Start: 2021-06-23 | End: 2021-06-24

## 2021-06-23 RX ORDER — NALBUPHINE HCL 10 MG/ML
5 AMPUL (ML) INJECTION EVERY 4 HOURS PRN
Status: DISCONTINUED | OUTPATIENT
Start: 2021-06-23 | End: 2021-06-24

## 2021-06-23 RX ORDER — ACETAMINOPHEN 650 MG
TABLET, EXTENDED RELEASE ORAL
Status: COMPLETED
Start: 2021-06-23 | End: 2021-06-24

## 2021-06-23 RX ADMIN — Medication 1 MILLI-UNITS/MIN: at 17:25

## 2021-06-23 RX ADMIN — SODIUM CHLORIDE, POTASSIUM CHLORIDE, SODIUM LACTATE AND CALCIUM CHLORIDE: 600; 310; 30; 20 INJECTION, SOLUTION INTRAVENOUS at 15:18

## 2021-06-23 RX ADMIN — BUPIVACAINE HYDROCHLORIDE 8 ML: 2.5 INJECTION, SOLUTION EPIDURAL; INFILTRATION; INTRACAUDAL; PERINEURAL at 22:33

## 2021-06-23 RX ADMIN — Medication 5 ML: at 16:57

## 2021-06-23 RX ADMIN — SODIUM CHLORIDE, POTASSIUM CHLORIDE, SODIUM LACTATE AND CALCIUM CHLORIDE 1000 ML: 600; 310; 30; 20 INJECTION, SOLUTION INTRAVENOUS at 15:00

## 2021-06-23 RX ADMIN — Medication 15 ML/HR: at 17:01

## 2021-06-23 RX ADMIN — ONDANSETRON 4 MG: 2 INJECTION INTRAMUSCULAR; INTRAVENOUS at 22:20

## 2021-06-23 ASSESSMENT — LIFESTYLE VARIABLES: SMOKING_STATUS: 1

## 2021-06-23 NOTE — H&P
Department of Obstetrics and Gynecology  Nurse Practitioner Obstetrics History and Physical        CHIEF COMPLAINT:  Contractions    HISTORY OF PRESENT ILLNESS:    The patient is a 23 y.o. female , Patient's last menstrual period was 2020 (lmp unknown). ,  at 40w2d. Pt presents to l&d with complaints abdominal cramping and loss of mucous plug. Pt states she started getting prenatal care at 17 weeks. EDC based off high risk ultrasound. gbs negative  OB History        1    Para        Term                AB        Living           SAB        TAB        Ectopic        Molar        Multiple        Live Births                    Estimated Due Date: Estimated Date of Delivery: 21    PRENATAL CARE:  uneventful    Complicated by:   Patient Active Problem List   Diagnosis Code    26 weeks gestation of pregnancy Z3A.26    Abdominal cramping affecting pregnancy, antepartum O26.899, R10.9       PAST OB HISTORY  OB History        1    Para        Term                AB        Living           SAB        TAB        Ectopic        Molar        Multiple        Live Births                    Past Medical History:    History reviewed. No pertinent past medical history. Past Surgical History:        Procedure Laterality Date    WISDOM TOOTH EXTRACTION       Social History:    TOBACCO:   reports that she quit smoking about 8 months ago. She has never used smokeless tobacco.  ETOH:   reports previous alcohol use. DRUGS:   reports previous drug use. Drug: Marijuana.   Family History:       Problem Relation Age of Onset    Hypertension Father     Diabetes Mother      Medications Prior to Admission:  Medications Prior to Admission: Prenatal Vit-Fe Fumarate-FA (PRENATAL 1 PLUS 1 PO), Take by mouth  Allergies:  No known allergies    Review of Systems:   Ears, nose, mouth, throat, and face: negative  Respiratory: negative  Cardiovascular: negative  Gastrointestinal: negative  Genitourinary:negative  Integument/breast: negative  Hematologic/lymphatic: negative  Musculoskeletal:negative  Neurological: negative  Behavioral/Psych: negative  Endocrine: negative  Allergic/Immunologic: negative  Psychosocial: negative    PHYSICAL EXAM:    General appearance:  awake, alert, cooperative, no apparent distress, and appears stated age  Neurologic:  Awake, alert, oriented to name, place and time. Cranial nerves II-XII are grossly intact. Lungs:  clear to auscultation bilaterally  Heart:  regular rate and rhythm  Abdomen:   soft, non-distended, non-tender, no masses palpated, gravid  Fetal heart rate:  Baseline Heart Rate 130, accelerations:  present, decels:none  Pelvis:  External Genitalia: no lesions  Cervix:  DILATION:  6 cm  EFFACEMENT:   100%  STATION:  -1  CONSISTENCY:  soft    Contraction frequency:  2-3 minutes  Membranes:  Intact    BP (!) 141/82   Pulse 111   Resp 16   Ht 5' 6\" (1.676 m)   Wt 219 lb (99.3 kg)   LMP 09/14/2020 (LMP Unknown)   BMI 35.35 kg/m²                 Prenatal Labs  Blood Type/Rh: A pos  Antibody Screen: negative  Rubella: immune  T.  Pallidum, IgG: non-reactive   Hepatitis B Surface Antigen: non-reactive   HIV: non-reactive   Sickle Cell Screen: not available  Gonorrhea: negative  Chlamydia: negative  Group B Strep: negative        ASSESSMENT AND PLAN:  D/w Dr. Rhett castillo [facility]  Admit  Routine admission orders  Epidural upon request        Electronically signed by PRICILA Kulkarni CNP on 6/23/2021 at 2:46 PM

## 2021-06-23 NOTE — ANESTHESIA PRE PROCEDURE
Diagnosis Code    26 weeks gestation of pregnancy Z3A.26    Abdominal cramping affecting pregnancy, antepartum O26.899, R10.9       Past Medical History:  History reviewed. No pertinent past medical history. Past Surgical History:        Procedure Laterality Date    WISDOM TOOTH EXTRACTION         Social History:    Social History     Tobacco Use    Smoking status: Former Smoker     Quit date: 10/4/2020     Years since quittin.7    Smokeless tobacco: Never Used   Substance Use Topics    Alcohol use: Not Currently                                Counseling given: Not Answered      Vital Signs (Current):   Vitals:    21 1434 21 1448 21 1457 21 1503   BP: (!) 160/84 (!) 0/0 133/79 126/73   Pulse: 79  83 89   Resp:       Weight:       Height:                                                  BP Readings from Last 3 Encounters:   21 126/73   21 120/73   21 137/80       NPO Status: Time of last liquid consumption: 0900                        Time of last solid consumption: 0900                        Date of last liquid consumption: 21                        Date of last solid food consumption: 21    BMI:   Wt Readings from Last 3 Encounters:   21 219 lb (99.3 kg)   21 219 lb (99.3 kg)   21 221 lb 12.8 oz (100.6 kg)     Body mass index is 35.35 kg/m². CBC:   Lab Results   Component Value Date    WBC 19.6 2021    RBC 4.70 2021    HGB 12.3 2021    HCT 37.9 2021    MCV 80.6 2021    RDW 14.6 2021     2021       CMP: No results found for: NA, K, CL, CO2, BUN, CREATININE, GFRAA, AGRATIO, LABGLOM, GLUCOSE, PROT, CALCIUM, BILITOT, ALKPHOS, AST, ALT    POC Tests: No results for input(s): POCGLU, POCNA, POCK, POCCL, POCBUN, POCHEMO, POCHCT in the last 72 hours.     Coags: No results found for: PROTIME, INR, APTT    HCG (If Applicable): No results found for: PREGTESTUR, PREGSERUM, HCG, HCGQUANT

## 2021-06-24 LAB
COMMENT: NORMAL
INTEGRITY CHECK, CREATININE, URINE: 146.1
INTEGRITY CHECK, OXIDANT, URINE: 54
INTEGRITY CHECK, PH, URINE: 6.7 (ref 4.5–9)
INTEGRITY CHECK, SPECIFIC GRAVITY, URINE: 1.01 (ref 1–1.03)
INTEGRITY CHECK, SPECIMEN INTEGRITY, URINE: NORMAL
THC NORMALIZED, QUANTITIATIVE, URINE: 19.1
THC-COOH, QUANTITATIVE, URINE: 27.9

## 2021-06-24 PROCEDURE — 51701 INSERT BLADDER CATHETER: CPT

## 2021-06-24 PROCEDURE — 88307 TISSUE EXAM BY PATHOLOGIST: CPT

## 2021-06-24 PROCEDURE — 90707 MMR VACCINE SC: CPT | Performed by: OBSTETRICS & GYNECOLOGY

## 2021-06-24 PROCEDURE — 6370000000 HC RX 637 (ALT 250 FOR IP): Performed by: OBSTETRICS & GYNECOLOGY

## 2021-06-24 PROCEDURE — 3E033VJ INTRODUCTION OF OTHER HORMONE INTO PERIPHERAL VEIN, PERCUTANEOUS APPROACH: ICD-10-PCS | Performed by: OBSTETRICS & GYNECOLOGY

## 2021-06-24 PROCEDURE — 2580000003 HC RX 258: Performed by: OBSTETRICS & GYNECOLOGY

## 2021-06-24 PROCEDURE — 1220000000 HC SEMI PRIVATE OB R&B

## 2021-06-24 PROCEDURE — 6360000002 HC RX W HCPCS: Performed by: OBSTETRICS & GYNECOLOGY

## 2021-06-24 PROCEDURE — 0HQ9XZZ REPAIR PERINEUM SKIN, EXTERNAL APPROACH: ICD-10-PCS | Performed by: OBSTETRICS & GYNECOLOGY

## 2021-06-24 PROCEDURE — 90472 IMMUNIZATION ADMIN EACH ADD: CPT | Performed by: OBSTETRICS & GYNECOLOGY

## 2021-06-24 PROCEDURE — 90471 IMMUNIZATION ADMIN: CPT | Performed by: OBSTETRICS & GYNECOLOGY

## 2021-06-24 PROCEDURE — 7200000001 HC VAGINAL DELIVERY

## 2021-06-24 PROCEDURE — 90715 TDAP VACCINE 7 YRS/> IM: CPT | Performed by: OBSTETRICS & GYNECOLOGY

## 2021-06-24 PROCEDURE — 10907ZC DRAINAGE OF AMNIOTIC FLUID, THERAPEUTIC FROM PRODUCTS OF CONCEPTION, VIA NATURAL OR ARTIFICIAL OPENING: ICD-10-PCS | Performed by: OBSTETRICS & GYNECOLOGY

## 2021-06-24 RX ORDER — HYDROCODONE BITARTRATE AND ACETAMINOPHEN 5; 325 MG/1; MG/1
1 TABLET ORAL EVERY 4 HOURS PRN
Status: DISCONTINUED | OUTPATIENT
Start: 2021-06-24 | End: 2021-06-25 | Stop reason: HOSPADM

## 2021-06-24 RX ORDER — MODIFIED LANOLIN
OINTMENT (GRAM) TOPICAL PRN
Status: DISCONTINUED | OUTPATIENT
Start: 2021-06-24 | End: 2021-06-25 | Stop reason: HOSPADM

## 2021-06-24 RX ORDER — SODIUM CHLORIDE 0.9 % (FLUSH) 0.9 %
5-40 SYRINGE (ML) INJECTION PRN
Status: DISCONTINUED | OUTPATIENT
Start: 2021-06-24 | End: 2021-06-25 | Stop reason: HOSPADM

## 2021-06-24 RX ORDER — ACETAMINOPHEN 325 MG/1
650 TABLET ORAL EVERY 4 HOURS PRN
Status: DISCONTINUED | OUTPATIENT
Start: 2021-06-24 | End: 2021-06-25 | Stop reason: HOSPADM

## 2021-06-24 RX ORDER — IBUPROFEN 800 MG/1
800 TABLET ORAL EVERY 8 HOURS PRN
Status: DISCONTINUED | OUTPATIENT
Start: 2021-06-24 | End: 2021-06-25 | Stop reason: HOSPADM

## 2021-06-24 RX ORDER — PANTOPRAZOLE SODIUM 40 MG/1
40 TABLET, DELAYED RELEASE ORAL DAILY PRN
Status: DISCONTINUED | OUTPATIENT
Start: 2021-06-24 | End: 2021-06-25 | Stop reason: HOSPADM

## 2021-06-24 RX ORDER — ONDANSETRON 4 MG/1
4 TABLET, ORALLY DISINTEGRATING ORAL EVERY 6 HOURS PRN
Status: DISCONTINUED | OUTPATIENT
Start: 2021-06-24 | End: 2021-06-25 | Stop reason: HOSPADM

## 2021-06-24 RX ORDER — SODIUM CHLORIDE 0.9 % (FLUSH) 0.9 %
5-40 SYRINGE (ML) INJECTION EVERY 12 HOURS SCHEDULED
Status: DISCONTINUED | OUTPATIENT
Start: 2021-06-24 | End: 2021-06-25 | Stop reason: HOSPADM

## 2021-06-24 RX ORDER — SODIUM CHLORIDE 9 MG/ML
25 INJECTION, SOLUTION INTRAVENOUS PRN
Status: DISCONTINUED | OUTPATIENT
Start: 2021-06-24 | End: 2021-06-25 | Stop reason: HOSPADM

## 2021-06-24 RX ORDER — DOCUSATE SODIUM 100 MG/1
100 CAPSULE, LIQUID FILLED ORAL 2 TIMES DAILY
Status: DISCONTINUED | OUTPATIENT
Start: 2021-06-24 | End: 2021-06-25 | Stop reason: HOSPADM

## 2021-06-24 RX ORDER — HYDROCODONE BITARTRATE AND ACETAMINOPHEN 5; 325 MG/1; MG/1
2 TABLET ORAL EVERY 4 HOURS PRN
Status: DISCONTINUED | OUTPATIENT
Start: 2021-06-24 | End: 2021-06-25 | Stop reason: HOSPADM

## 2021-06-24 RX ORDER — SIMETHICONE 80 MG
80 TABLET,CHEWABLE ORAL EVERY 6 HOURS PRN
Status: DISCONTINUED | OUTPATIENT
Start: 2021-06-24 | End: 2021-06-25 | Stop reason: HOSPADM

## 2021-06-24 RX ORDER — BISACODYL 10 MG
10 SUPPOSITORY, RECTAL RECTAL DAILY PRN
Status: DISCONTINUED | OUTPATIENT
Start: 2021-06-24 | End: 2021-06-25 | Stop reason: HOSPADM

## 2021-06-24 RX ORDER — FERROUS SULFATE 325(65) MG
325 TABLET ORAL
Status: DISCONTINUED | OUTPATIENT
Start: 2021-06-24 | End: 2021-06-25 | Stop reason: HOSPADM

## 2021-06-24 RX ORDER — SODIUM CHLORIDE, SODIUM LACTATE, POTASSIUM CHLORIDE, CALCIUM CHLORIDE 600; 310; 30; 20 MG/100ML; MG/100ML; MG/100ML; MG/100ML
INJECTION, SOLUTION INTRAVENOUS CONTINUOUS
Status: DISCONTINUED | OUTPATIENT
Start: 2021-06-24 | End: 2021-06-25 | Stop reason: HOSPADM

## 2021-06-24 RX ADMIN — Medication: at 09:27

## 2021-06-24 RX ADMIN — DOCUSATE SODIUM 100 MG: 100 CAPSULE, LIQUID FILLED ORAL at 09:28

## 2021-06-24 RX ADMIN — IBUPROFEN 800 MG: 800 TABLET, FILM COATED ORAL at 03:37

## 2021-06-24 RX ADMIN — Medication 166.7 ML: at 00:40

## 2021-06-24 RX ADMIN — Medication: at 00:30

## 2021-06-24 RX ADMIN — Medication 5 ML: at 22:30

## 2021-06-24 RX ADMIN — TETANUS TOXOID, REDUCED DIPHTHERIA TOXOID AND ACELLULAR PERTUSSIS VACCINE, ADSORBED 0.5 ML: 5; 2.5; 8; 8; 2.5 SUSPENSION INTRAMUSCULAR at 22:26

## 2021-06-24 RX ADMIN — IBUPROFEN 800 MG: 800 TABLET, FILM COATED ORAL at 22:25

## 2021-06-24 RX ADMIN — MEASLES, MUMPS, AND RUBELLA VIRUS VACCINE LIVE 0.5 ML: 1000; 12500; 1000 INJECTION, POWDER, LYOPHILIZED, FOR SUSPENSION SUBCUTANEOUS at 22:27

## 2021-06-24 RX ADMIN — DOCUSATE SODIUM 100 MG: 100 CAPSULE, LIQUID FILLED ORAL at 22:38

## 2021-06-24 RX ADMIN — HYDROCODONE BITARTRATE AND ACETAMINOPHEN 1 TABLET: 5; 325 TABLET ORAL at 19:11

## 2021-06-24 ASSESSMENT — PAIN SCALES - GENERAL
PAINLEVEL_OUTOF10: 4
PAINLEVEL_OUTOF10: 3

## 2021-06-24 ASSESSMENT — PAIN DESCRIPTION - RADICULAR PAIN: RADICULAR_PAIN: ABSENT

## 2021-06-24 NOTE — CARE COORDINATION
SW Discharge Planning     SW noted mother with positive UDS for [facility] on 6/23/21    SW met with Sandeep Marcia (  [phone]) first time mother to baby girl Josie Jules ( 6/24/21) and introduced self and role. Also present in the room was reported baby's father, Jeffrey Perez ( 3/9/94) who Radha Rayo gave SW permission to speak freely in front of. Radha Rayo reported that she and Tyrese reside at the address listed in the chart. Radha Rayo stated that she is currently unemployed and baby will be added to her [employer]. Per Radha Rayo, prenatal care was with Dr. Keith Casillas, and pediatric care will be with [facility]. Radha Rayo Reported that she has all needed items including a car seat and pack and play. We discussed safe sleep practices. Latricia Gage was agreeable to a HMG and WIC referral. Radha Rayo  denied any past or current history of children services involvement, legal issues, substance abuse aside from [facility], domestic violence or mental health diagnosis. We discussed awareness of Post Partum Depression and encouraged contact with her OB if any problems arise. Radha Rayo did report using THC earlier during pregnancy and reported that she stopped usage 1-2 months ago. Radha Rayo expressed understanding for a [employer] ( [phone]) referral.     During assessment both Radha Rayo and Tyrese easily engaged in conversation and were pleasant and polite. Baby was noted to be sleeping in bassinet.      SW completed [employer] ( [phone]) referral to Joe Hart in intake     PLAN    Baby can NOT be discharged home until [facility] ( [phone]) provides disposition  SW to continue communication with nursing staff and [facility] ( [phone])    WIC and HMG referral was completed     Electronically signed by JULIO CESAR Ovalle on 6/24/2021 at 9:30 AM

## 2021-06-24 NOTE — PLAN OF CARE
Problem: Fluid Volume - Imbalance:  Goal: Absence of imbalanced fluid volume signs and symptoms  Description: Absence of imbalanced fluid volume signs and symptoms  Outcome: Met This Shift  Goal: Absence of postpartum hemorrhage signs and symptoms  Description: Absence of postpartum hemorrhage signs and symptoms  Outcome: Met This Shift     Problem: Infection - Risk of, Puerperal Infection:  Goal: Will show no infection signs and symptoms  Description: Will show no infection signs and symptoms  Outcome: Met This Shift     Problem: Mood - Altered:  Goal: Mood stable  Description: Mood stable  Outcome: Met This Shift     Problem: Pain - Acute:  Goal: Pain level will decrease  Description: Pain level will decrease  Outcome: Met This Shift

## 2021-06-24 NOTE — LACTATION NOTE
Assisted this pt with position and latch. Baby takes breast very well. Encouraged skin to skin and frequent attempts at breast to stimulate milk production. Instructed on normal infant behavior in the first 12-24 hours and importance of stimulating the baby frequently to eat during this time. Reviewed hand expression, and encouraged to hand express drops of colostrum when baby is sleepy. Instructed that baby may also feed 8-12 times a day- cluster feeding at times- as her milk supply is being established. Instructed on benefits of skin to skin and avoidance of pacifier / artificial nipple use until breastfeeding is well established. Educated on making sure infant has an open airway while breastfeeding and skin to skin. Instructed on hunger cues and waking techniques to try. Reviewed signs of adequate I & O; allow baby to feed ad pilar and not to limit time at breast. Information given regarding health benefits of colostrum and exclusive breastfeeding. Encouraged to call with any concerns.

## 2021-06-24 NOTE — PROCEDURES
DELIVERY NOTE    female infant   [de-identified] pend  Shoulder dystocia relieved with suprapubic pressure and Vargas manuever  Bulb suction on perineum - terminal meconium  Cord clamped and cut after 30 second delay  Handed to waiting nursing staff  Placenta delivered without complication with three vessel cord intact  Cord gases obtained  No complications  Condition stable  First degree vag lac repaired with vicryl  Sponge count correct  Mom and baby to post partum    Germain Jacques MD

## 2021-06-24 NOTE — ANESTHESIA POSTPROCEDURE EVALUATION
Department of Anesthesiology  Postprocedure Note    Patient: Velasquez Mabry  MRN: [id number]  YOB: 2000  Date of evaluation: 6/24/2021  Time:  9:20 AM     Procedure Summary     Date: 06/23/21 Room / Location:     Anesthesia Start: 1633 Anesthesia Stop: 06/24/21 0031    Procedure: Labor Analgesia Diagnosis:     Scheduled Providers:  Responsible Provider: Julissa Edwards MD    Anesthesia Type: epidural, spinal, general ASA Status: 2          Anesthesia Type: epidural, spinal, general    Ailyn Phase I:      Ailyn Phase II:      Last vitals: Reviewed and per EMR flowsheets.        Anesthesia Post Evaluation    Patient location during evaluation: bedside  Patient participation: complete - patient participated  Level of consciousness: awake and alert  Pain score: 2  Airway patency: patent  Nausea & Vomiting: no nausea and no vomiting  Complications: no  Cardiovascular status: blood pressure returned to baseline and hemodynamically stable  Respiratory status: acceptable  Hydration status: stable

## 2021-06-25 VITALS
RESPIRATION RATE: 18 BRPM | WEIGHT: 219 LBS | SYSTOLIC BLOOD PRESSURE: 117 MMHG | TEMPERATURE: 98.2 F | HEIGHT: 66 IN | BODY MASS INDEX: 35.2 KG/M2 | DIASTOLIC BLOOD PRESSURE: 72 MMHG | HEART RATE: 75 BPM | OXYGEN SATURATION: 98 %

## 2021-06-25 LAB
HCT VFR BLD CALC: 30.2 % (ref 34–48)
HEMOGLOBIN: 9 G/DL (ref 11.5–15.5)

## 2021-06-25 PROCEDURE — 6370000000 HC RX 637 (ALT 250 FOR IP): Performed by: OBSTETRICS & GYNECOLOGY

## 2021-06-25 PROCEDURE — 85018 HEMOGLOBIN: CPT

## 2021-06-25 PROCEDURE — 36415 COLL VENOUS BLD VENIPUNCTURE: CPT

## 2021-06-25 PROCEDURE — 85014 HEMATOCRIT: CPT

## 2021-06-25 RX ORDER — IBUPROFEN 800 MG/1
800 TABLET ORAL EVERY 8 HOURS PRN
Qty: 120 TABLET | Refills: 1 | Status: SHIPPED | OUTPATIENT
Start: 2021-06-25

## 2021-06-25 RX ORDER — FERROUS SULFATE 325(65) MG
325 TABLET ORAL
Qty: 30 TABLET | Refills: 3 | Status: SHIPPED | OUTPATIENT
Start: 2021-06-25

## 2021-06-25 RX ADMIN — DOCUSATE SODIUM 100 MG: 100 CAPSULE, LIQUID FILLED ORAL at 08:51

## 2021-06-25 RX ADMIN — IBUPROFEN 800 MG: 800 TABLET, FILM COATED ORAL at 08:51

## 2021-06-25 RX ADMIN — FERROUS SULFATE TAB 325 MG (65 MG ELEMENTAL FE) 325 MG: 325 (65 FE) TAB at 08:51

## 2021-06-25 RX ADMIN — FERROUS SULFATE TAB 325 MG (65 MG ELEMENTAL FE) 325 MG: 325 (65 FE) TAB at 13:44

## 2021-06-25 ASSESSMENT — PAIN DESCRIPTION - PROGRESSION: CLINICAL_PROGRESSION: GRADUALLY WORSENING

## 2021-06-25 ASSESSMENT — PAIN DESCRIPTION - RADICULAR PAIN: RADICULAR_PAIN: ABSENT

## 2021-06-25 ASSESSMENT — PAIN - FUNCTIONAL ASSESSMENT: PAIN_FUNCTIONAL_ASSESSMENT: ACTIVITIES ARE NOT PREVENTED

## 2021-06-25 ASSESSMENT — PAIN SCALES - GENERAL
PAINLEVEL_OUTOF10: 0
PAINLEVEL_OUTOF10: 3

## 2021-06-25 ASSESSMENT — PAIN DESCRIPTION - FREQUENCY: FREQUENCY: INTERMITTENT

## 2021-06-25 ASSESSMENT — PAIN DESCRIPTION - LOCATION: LOCATION: ABDOMEN

## 2021-06-25 ASSESSMENT — PAIN DESCRIPTION - ONSET: ONSET: GRADUAL

## 2021-06-25 ASSESSMENT — PAIN DESCRIPTION - ORIENTATION: ORIENTATION: LOWER;MID

## 2021-06-25 ASSESSMENT — PAIN DESCRIPTION - PAIN TYPE: TYPE: ACUTE PAIN

## 2021-06-25 ASSESSMENT — PAIN DESCRIPTION - DESCRIPTORS: DESCRIPTORS: DISCOMFORT

## 2021-06-25 NOTE — LACTATION NOTE
Patient reports breastfeeding is going well and latch is comfortable. Declined need for lactation assistance and no questions at this time. Faxed order to DME for electric breast pump.

## 2021-06-25 NOTE — PLAN OF CARE
Problem: Fluid Volume - Imbalance:  Goal: Absence of imbalanced fluid volume signs and symptoms  Description: Absence of imbalanced fluid volume signs and symptoms  Outcome: Met This Shift  Goal: Absence of postpartum hemorrhage signs and symptoms  Description: Absence of postpartum hemorrhage signs and symptoms  Outcome: Met This Shift     Problem: Mood - Altered:  Goal: Mood stable  Description: Mood stable  Outcome: Met This Shift     Problem: Pain - Acute:  Goal: Pain level will decrease  Description: Pain level will decrease  Outcome: Met This Shift

## 2021-06-25 NOTE — PROGRESS NOTES
Postpartum Day 1: Vaginal Delivery    The patient feels well. Pain is well controlled with current medications. Baby is feeding via breast.     Objective:        Vitals:    06/25/21 0817   BP: 117/72   Pulse: 75   Resp: 18   Temp: 98.2 °F (36.8 °C)   SpO2: 98%         Lab Results   Component Value Date    WBC 19.6 (H) 06/23/2021    HGB 9.0 (L) 06/25/2021    HCT 30.2 (L) 06/25/2021    MCV 80.6 06/23/2021     06/23/2021       General:    alert, appears stated age and cooperative   Lochia:  appropriate   Uterine    firm   DVT Evaluation:  No evidence of DVT seen on physical exam.     Assessment:     Status post Vaginal Delivery. good    Plan:     Discharge home with standard precautions and return to clinic in 4-6 weeks.

## 2021-06-25 NOTE — CARE COORDINATION
SW Discharge Planning     SW called [facility] ( [phone]) and spoke with , Maureen Liu, who reported that [facility] ( [phone]) will NOT be involved at this time     PLAN    Baby CAN be discharged home when medically ready, children services will NOT be involved at this time.      Electronically signed by JULIO CESAR Luis on 6/25/2021 at 9:35 AM

## 2021-07-06 NOTE — CARE COORDINATION
SW Discharge Planning     SW noted baby's cordstat to be positive for THC.  SW called [facility] ( [phone]) and provided information to , Justino Milligan     Electronically signed by JULIO CESAR Martin on 7/6/2021 at 10:38 AM

## 2021-08-25 NOTE — DISCHARGE SUMMARY
Obstetric Discharge Summary    Admitting Diagnosis  IUP term weeks  OB History as of 2021        1    Para   1    Term   1            AB        Living   1       SAB        TAB        Ectopic        Molar        Multiple   0    Live Births   1                Reasons for Admission on 2021  1:21 PM  Abdominal cramping affecting pregnancy, antepartum [O26.899, R10.9]  No comment available  Onset of Labor  Induction of Labor    Prenatal Procedures  None    Intrapartum Procedures                 Spontaneous Vaginal Delivery: See Labor and Delivery Summary       Postpartum Procedures  None    Postpartum/Operative Complications       [place] Data  Information for the patient's :  Amy Vasquez [[id number]]   female   Birth Weight: 8 lb 11.7 oz (3.96 kg)     Discharge With Mother  Complications: No    Discharge Diagnosis       Discharge Information  Discharge Medication List as of 2021  3:24 PM      START taking these medications    Details   ibuprofen (ADVIL;MOTRIN) 800 MG tablet Take 1 tablet by mouth every 8 hours as needed for Pain, Disp-120 tablet, R-1Normal      ferrous sulfate (IRON 325) 325 (65 Fe) MG tablet Take 1 tablet by mouth 3 times daily (with meals), Disp-30 tablet, R-3Normal         CONTINUE these medications which have NOT CHANGED    Details   Prenatal Vit-Fe Fumarate-FA (PRENATAL 1 PLUS 1 PO) Take by mouthHistorical Med             No discharge procedures on file.     Discharge to: Home  Follow up in 6 weeks       Comments

## 2022-01-03 ENCOUNTER — HOSPITAL ENCOUNTER (OUTPATIENT)
Dept: HOSPITAL 83 - LAB | Age: 22
Discharge: HOME | End: 2022-01-03
Attending: FAMILY MEDICINE
Payer: COMMERCIAL

## 2022-01-03 DIAGNOSIS — E55.9: Primary | ICD-10-CM

## 2022-01-03 LAB
25(OH)D3 SERPL-MCNC: 22.9 NG/ML (ref 30–100)
ALBUMIN SERPL-MCNC: 3.9 GM/DL (ref 3.1–4.5)
ALP SERPL-CCNC: 95 U/L (ref 45–117)
ALT SERPL W P-5'-P-CCNC: 40 U/L (ref 12–78)
AST SERPL-CCNC: 20 IU/L (ref 3–35)
BUN SERPL-MCNC: 13 MG/DL (ref 7–24)
CHLORIDE SERPL-SCNC: 108 MMOL/L (ref 98–107)
CREAT SERPL-MCNC: 0.84 MG/DL (ref 0.55–1.02)
ERYTHROCYTE [DISTWIDTH] IN BLOOD BY AUTOMATED COUNT: 15.1 % (ref 0–14.5)
HCT VFR BLD AUTO: 43.4 % (ref 37–47)
MCH RBC QN AUTO: 25.3 PG (ref 27–31)
MCHC RBC AUTO-ENTMCNC: 31.6 G/DL (ref 33–37)
MCV RBC AUTO: 80.1 FL (ref 81–99)
NRBC BLD QL AUTO: 0 % (ref 0–0)
PLATELET # BLD AUTO: 287 10*3/UL (ref 130–400)
PMV BLD AUTO: 9.7 FL (ref 9.6–12.3)
POTASSIUM SERPL-SCNC: 3.5 MMOL/L (ref 3.5–5.1)
PROT SERPL-MCNC: 8 GM/DL (ref 6.4–8.2)
RBC # BLD AUTO: 5.42 10*6/UL (ref 4.1–5.1)
SODIUM SERPL-SCNC: 140 MMOL/L (ref 136–145)
T4 FREE SERPL-MCNC: 0.96 NG/DL (ref 0.76–1.46)
TSH SERPL DL<=0.005 MIU/L-ACNC: 1.32 UIU/ML (ref 0.36–4.75)
VITAMIN B12: 435 PG/ML (ref 247–911)
WBC NRBC COR # BLD AUTO: 11.7 10*3/UL (ref 4.8–10.8)

## 2022-01-18 ENCOUNTER — HOSPITAL ENCOUNTER (OUTPATIENT)
Dept: HOSPITAL 83 - LAB | Age: 22
Discharge: HOME | End: 2022-01-18
Attending: FAMILY MEDICINE
Payer: COMMERCIAL

## 2022-01-18 DIAGNOSIS — D72.829: Primary | ICD-10-CM

## 2022-01-18 LAB
BASOPHILS # BLD AUTO: 0.1 10*3/UL (ref 0–0.1)
BASOPHILS NFR BLD AUTO: 0.5 % (ref 0–1)
EOSINOPHIL # BLD AUTO: 0.2 10*3/UL (ref 0–0.4)
EOSINOPHIL # BLD AUTO: 1.9 % (ref 1–4)
ERYTHROCYTE [DISTWIDTH] IN BLOOD BY AUTOMATED COUNT: 14.6 % (ref 0–14.5)
HCT VFR BLD AUTO: 42.3 % (ref 37–47)
LYMPHOCYTES # BLD AUTO: 3.9 10*3/UL (ref 1.3–4.4)
LYMPHOCYTES NFR BLD AUTO: 31.9 % (ref 27–41)
MCH RBC QN AUTO: 25.6 PG (ref 27–31)
MCHC RBC AUTO-ENTMCNC: 31.4 G/DL (ref 33–37)
MCV RBC AUTO: 81.5 FL (ref 81–99)
MONOCYTES # BLD AUTO: 0.6 10*3/UL (ref 0.1–1)
MONOCYTES NFR BLD MANUAL: 5.1 % (ref 3–9)
NEUT #: 7.4 10*3/UL (ref 2.3–7.9)
NEUT %: 60.3 % (ref 47–73)
NRBC BLD QL AUTO: 0 % (ref 0–0)
PLATELET # BLD AUTO: 269 10*3/UL (ref 130–400)
PMV BLD AUTO: 10.2 FL (ref 9.6–12.3)
RBC # BLD AUTO: 5.19 10*6/UL (ref 4.1–5.1)
WBC NRBC COR # BLD AUTO: 12.2 10*3/UL (ref 4.8–10.8)

## 2022-09-14 ENCOUNTER — HOSPITAL ENCOUNTER (EMERGENCY)
Dept: HOSPITAL 83 - ED | Age: 22
Discharge: HOME | End: 2022-09-14
Payer: COMMERCIAL

## 2022-09-14 VITALS — WEIGHT: 220 LBS | BODY MASS INDEX: 34.53 KG/M2 | HEIGHT: 66.97 IN

## 2022-09-14 DIAGNOSIS — M54.2: ICD-10-CM

## 2022-09-14 DIAGNOSIS — M54.50: ICD-10-CM

## 2022-09-14 DIAGNOSIS — M62.838: Primary | ICD-10-CM

## 2022-09-14 LAB
ALP SERPL-CCNC: 95 U/L (ref 45–117)
ALT SERPL W P-5'-P-CCNC: 41 U/L (ref 12–78)
AST SERPL-CCNC: 21 IU/L (ref 3–35)
BASOPHILS # BLD AUTO: 0.1 10*3/UL (ref 0–0.1)
BASOPHILS NFR BLD AUTO: 0.4 % (ref 0–1)
BUN SERPL-MCNC: 16 MG/DL (ref 7–24)
CHLORIDE SERPL-SCNC: 110 MMOL/L (ref 98–107)
CREAT SERPL-MCNC: 0.86 MG/DL (ref 0.55–1.02)
EOSINOPHIL # BLD AUTO: 0.2 10*3/UL (ref 0–0.4)
EOSINOPHIL # BLD AUTO: 1.1 % (ref 1–4)
ERYTHROCYTE [DISTWIDTH] IN BLOOD BY AUTOMATED COUNT: 14.3 % (ref 0–14.5)
HCT VFR BLD AUTO: 43.6 % (ref 37–47)
LYMPHOCYTES # BLD AUTO: 3.9 10*3/UL (ref 1.3–4.4)
LYMPHOCYTES NFR BLD AUTO: 28.6 % (ref 27–41)
MCH RBC QN AUTO: 26.1 PG (ref 27–31)
MCHC RBC AUTO-ENTMCNC: 31.7 G/DL (ref 33–37)
MCV RBC AUTO: 82.6 FL (ref 81–99)
MONOCYTES # BLD AUTO: 0.7 10*3/UL (ref 0.1–1)
MONOCYTES NFR BLD MANUAL: 4.9 % (ref 3–9)
NEUT #: 8.8 10*3/UL (ref 2.3–7.9)
NEUT %: 64.7 % (ref 47–73)
NRBC BLD QL AUTO: 0 % (ref 0–0)
PLATELET # BLD AUTO: 258 10*3/UL (ref 130–400)
PMV BLD AUTO: 10.3 FL (ref 9.6–12.3)
POTASSIUM SERPL-SCNC: 3.8 MMOL/L (ref 3.5–5.1)
PROT SERPL-MCNC: 7.5 GM/DL (ref 6.4–8.2)
RBC # BLD AUTO: 5.28 10*6/UL (ref 4.1–5.1)
SODIUM SERPL-SCNC: 139 MMOL/L (ref 136–145)
WBC NRBC COR # BLD AUTO: 13.6 10*3/UL (ref 4.8–10.8)

## 2022-09-20 ENCOUNTER — HOSPITAL ENCOUNTER (OUTPATIENT)
Dept: HOSPITAL 83 - LAB | Age: 22
Discharge: HOME | End: 2022-09-20
Attending: FAMILY MEDICINE
Payer: COMMERCIAL

## 2022-09-20 DIAGNOSIS — L68.0: ICD-10-CM

## 2022-09-20 DIAGNOSIS — L70.0: ICD-10-CM

## 2022-09-20 DIAGNOSIS — R63.5: ICD-10-CM

## 2022-09-20 DIAGNOSIS — R53.83: Primary | ICD-10-CM

## 2022-09-20 LAB
25(OH)D3 SERPL-MCNC: 27.3 NG/ML (ref 30–100)
ALP SERPL-CCNC: 89 U/L (ref 45–117)
ALT SERPL W P-5'-P-CCNC: 26 U/L (ref 12–78)
AST SERPL-CCNC: 12 IU/L (ref 3–35)
BUN SERPL-MCNC: 15 MG/DL (ref 7–24)
CHLORIDE SERPL-SCNC: 108 MMOL/L (ref 98–107)
CHOLEST SERPL-MCNC: 129 MG/DL (ref ?–200)
CREAT SERPL-MCNC: 0.95 MG/DL (ref 0.55–1.02)
ERYTHROCYTE [DISTWIDTH] IN BLOOD BY AUTOMATED COUNT: 14.6 % (ref 0–14.5)
HCT VFR BLD AUTO: 45.5 % (ref 37–47)
LDLC SERPL DIRECT ASSAY-MCNC: 68 MG/DL (ref 9–159)
MCH RBC QN AUTO: 26.8 PG (ref 27–31)
MCHC RBC AUTO-ENTMCNC: 32.1 G/DL (ref 33–37)
MCV RBC AUTO: 83.5 FL (ref 81–99)
NRBC BLD QL AUTO: 0 10*3/UL (ref 0–0)
PLATELET # BLD AUTO: 262 10*3/UL (ref 130–400)
PMV BLD AUTO: 10 FL (ref 9.6–12.3)
POTASSIUM SERPL-SCNC: 3.7 MMOL/L (ref 3.5–5.1)
PROT SERPL-MCNC: 7.4 GM/DL (ref 6.4–8.2)
RBC # BLD AUTO: 5.45 10*6/UL (ref 4.1–5.1)
SODIUM SERPL-SCNC: 140 MMOL/L (ref 136–145)
T4 FREE SERPL-MCNC: 1.2 NG/DL (ref 0.76–1.46)
TRIGL SERPL-MCNC: 155 MG/DL (ref ?–150)
TSH SERPL DL<=0.005 MIU/L-ACNC: 3.85 UIU/ML (ref 0.36–4.75)
VITAMIN B12: 320 PG/ML (ref 247–911)
WBC NRBC COR # BLD AUTO: 14 10*3/UL (ref 4.8–10.8)

## 2022-09-23 LAB — TESTOST FREE SERPL-MCNC: 0.8 PG/ML (ref 0–4.2)

## 2022-12-03 ENCOUNTER — HOSPITAL ENCOUNTER (EMERGENCY)
Dept: HOSPITAL 83 - ED | Age: 22
Discharge: LEFT BEFORE DISCHARGE | End: 2022-12-03
Payer: COMMERCIAL

## 2022-12-03 DIAGNOSIS — Z53.21: Primary | ICD-10-CM

## 2023-05-24 ENCOUNTER — HOSPITAL ENCOUNTER (OUTPATIENT)
Dept: HOSPITAL 83 - LAB | Age: 23
Discharge: HOME | End: 2023-05-24
Attending: FAMILY MEDICINE
Payer: COMMERCIAL

## 2023-05-24 DIAGNOSIS — L68.0: ICD-10-CM

## 2023-05-24 DIAGNOSIS — E66.9: ICD-10-CM

## 2023-05-24 DIAGNOSIS — E78.00: Primary | ICD-10-CM

## 2023-05-24 DIAGNOSIS — E55.9: ICD-10-CM

## 2023-05-24 DIAGNOSIS — R53.83: ICD-10-CM

## 2023-05-24 DIAGNOSIS — D72.829: ICD-10-CM

## 2023-05-24 LAB
25(OH)D3 SERPL-MCNC: 30.7 NG/ML (ref 30–100)
ALP SERPL-CCNC: 94 U/L (ref 46–116)
ALT SERPL W P-5'-P-CCNC: 46 U/L (ref 10–49)
BUN SERPL-MCNC: 12 MG/DL (ref 9–23)
CHLORIDE SERPL-SCNC: 107 MMOL/L (ref 98–107)
CHOLEST SERPL-MCNC: 113 MG/DL (ref ?–200)
ERYTHROCYTE [DISTWIDTH] IN BLOOD BY AUTOMATED COUNT: 13.6 % (ref 0–14.5)
HCT VFR BLD AUTO: 45 % (ref 37–47)
LDLC SERPL DIRECT ASSAY-MCNC: 65 MG/DL (ref 9–159)
MCH RBC QN AUTO: 26.8 PG (ref 27–31)
MCHC RBC AUTO-ENTMCNC: 32.2 G/DL (ref 33–37)
MCV RBC AUTO: 83 FL (ref 81–99)
NRBC BLD QL AUTO: 0 10*3/UL (ref 0–0)
PLATELET # BLD AUTO: 286 10*3/UL (ref 130–400)
PMV BLD AUTO: 9.9 FL (ref 9.6–12.3)
POTASSIUM SERPL-SCNC: 3.9 MMOL/L (ref 3.4–5.1)
PROT SERPL-MCNC: 7.6 GM/DL (ref 6–8)
RBC # BLD AUTO: 5.42 10*6/UL (ref 4.1–5.1)
T4 FREE SERPL-MCNC: 1.11 NG/DL (ref 0.89–1.76)
TRIGL SERPL-MCNC: 88 MG/DL (ref ?–150)
TSH SERPL DL<=0.005 MIU/L-ACNC: 1.73 UIU/ML (ref 0.55–4.78)
VITAMIN B12: 349 PG/ML (ref 211–911)
WBC NRBC COR # BLD AUTO: 9.3 10*3/UL (ref 4.8–10.8)

## 2023-05-26 LAB — TESTOST FREE SERPL-MCNC: 1.3 PG/ML (ref 0–4.2)
